# Patient Record
Sex: FEMALE | URBAN - NONMETROPOLITAN AREA
[De-identification: names, ages, dates, MRNs, and addresses within clinical notes are randomized per-mention and may not be internally consistent; named-entity substitution may affect disease eponyms.]

---

## 2022-01-12 ENCOUNTER — APPOINTMENT (RX ONLY)
Dept: URBAN - NONMETROPOLITAN AREA CLINIC 4 | Facility: CLINIC | Age: 77
Setting detail: DERMATOLOGY
End: 2022-01-12

## 2022-01-12 DIAGNOSIS — L82.0 INFLAMED SEBORRHEIC KERATOSIS: ICD-10-CM

## 2022-01-12 PROCEDURE — ? LIQUID NITROGEN

## 2022-01-12 PROCEDURE — ? COUNSELING

## 2022-01-12 PROCEDURE — 17110 DESTRUCTION B9 LES UP TO 14: CPT

## 2022-01-12 PROCEDURE — ? MEDICARE ABN

## 2022-01-12 ASSESSMENT — LOCATION DETAILED DESCRIPTION DERM: LOCATION DETAILED: LEFT LATERAL CANTHUS

## 2022-01-12 ASSESSMENT — LOCATION ZONE DERM: LOCATION ZONE: EYELID

## 2022-01-12 ASSESSMENT — LOCATION SIMPLE DESCRIPTION DERM: LOCATION SIMPLE: LEFT EYELID

## 2022-01-12 NOTE — PROCEDURE: LIQUID NITROGEN
Spray Paint Technique: No
Duration Of Freeze Thaw-Cycle (Seconds): 5-10
Detail Level: Zone
Show Spray Paint Technique Variable?: Yes
Post-Care Instructions: I reviewed with the patient in detail post-care instructions. Patient is to wear sunprotection, and avoid picking at any of the treated lesions. Pt may apply Vaseline to crusted or scabbing areas.
Consent: The patient's consent was obtained including but not limited to risks of crusting, scabbing, blistering, scarring, darker or lighter pigmentary change, recurrence, incomplete removal and infection.
Medical Necessity Information: It is in your best interest to select a reason for this procedure from the list below. All of these items fulfill various CMS LCD requirements except the new and changing color options.
Spray Paint Text: The liquid nitrogen was applied to the skin utilizing a spray paint frosting technique.
Number Of Freeze-Thaw Cycles: 1 freeze-thaw cycle
Medical Necessity Clause: This procedure was medically necessary because the lesions that were treated were:

## 2023-01-09 ENCOUNTER — APPOINTMENT (RX ONLY)
Dept: URBAN - NONMETROPOLITAN AREA CLINIC 4 | Facility: CLINIC | Age: 78
Setting detail: DERMATOLOGY
End: 2023-01-09

## 2023-01-09 DIAGNOSIS — L30.0 NUMMULAR DERMATITIS: ICD-10-CM | Status: INADEQUATELY CONTROLLED

## 2023-01-09 PROCEDURE — ? TREATMENT REGIMEN

## 2023-01-09 PROCEDURE — ? COUNSELING

## 2023-01-09 PROCEDURE — ? PRESCRIPTION

## 2023-01-09 PROCEDURE — 99213 OFFICE O/P EST LOW 20 MIN: CPT

## 2023-01-09 RX ORDER — CLOBETASOL PROPIONATE 0.5 MG/G
0.05% OINTMENT TOPICAL BID
Qty: 60 | Refills: 3 | Status: ERX | COMMUNITY
Start: 2023-01-09

## 2023-01-09 RX ADMIN — CLOBETASOL PROPIONATE 0.05: 0.5 OINTMENT TOPICAL at 00:00

## 2023-01-09 ASSESSMENT — LOCATION ZONE DERM
LOCATION ZONE: ARM
LOCATION ZONE: LEG

## 2023-01-09 ASSESSMENT — LOCATION DETAILED DESCRIPTION DERM
LOCATION DETAILED: LEFT ANTERIOR PROXIMAL THIGH
LOCATION DETAILED: LEFT ANTERIOR DISTAL UPPER ARM
LOCATION DETAILED: LEFT VENTRAL DISTAL FOREARM
LOCATION DETAILED: RIGHT VENTRAL LATERAL PROXIMAL FOREARM
LOCATION DETAILED: RIGHT ANTERIOR PROXIMAL UPPER ARM
LOCATION DETAILED: RIGHT PROXIMAL PRETIBIAL REGION

## 2023-01-09 ASSESSMENT — LOCATION SIMPLE DESCRIPTION DERM
LOCATION SIMPLE: LEFT UPPER ARM
LOCATION SIMPLE: LEFT THIGH
LOCATION SIMPLE: RIGHT UPPER ARM
LOCATION SIMPLE: LEFT FOREARM
LOCATION SIMPLE: RIGHT FOREARM
LOCATION SIMPLE: RIGHT PRETIBIAL REGION

## 2023-01-09 NOTE — HPI: RASH
What Type Of Note Output Would You Prefer (Optional)?: Standard Output
Is The Patient Presenting As Previously Scheduled?: No, they are a work-in
How Severe Is Your Rash?: mild
Is This A New Presentation, Or A Follow-Up?: Rash
Additional History: Patient went to her PCP who put her on Prednisone and Keflex for 10 days and gave her Clotrimazole Betamethasone which helped but did not take it away.

## 2023-01-09 NOTE — PROCEDURE: TREATMENT REGIMEN
Detail Level: Zone
Discontinue Regimen: Clotrimazole Betamethasone; Dial Soap
Initiate Treatment: Clobetasol ointment 0.05% BID x 6 weeks; daily moisturizer Cerave or Cetaphil; Dove Soap for Sensitive Skin

## 2023-02-20 ENCOUNTER — APPOINTMENT (RX ONLY)
Dept: URBAN - NONMETROPOLITAN AREA CLINIC 4 | Facility: CLINIC | Age: 78
Setting detail: DERMATOLOGY
End: 2023-02-20

## 2023-02-20 DIAGNOSIS — L30.0 NUMMULAR DERMATITIS: ICD-10-CM | Status: IMPROVED

## 2023-02-20 PROCEDURE — ? TREATMENT REGIMEN

## 2023-02-20 PROCEDURE — 99213 OFFICE O/P EST LOW 20 MIN: CPT

## 2023-02-20 PROCEDURE — ? COUNSELING

## 2023-02-20 ASSESSMENT — LOCATION SIMPLE DESCRIPTION DERM
LOCATION SIMPLE: LEFT HAND
LOCATION SIMPLE: RIGHT FOREARM
LOCATION SIMPLE: LEFT PRETIBIAL REGION
LOCATION SIMPLE: RIGHT PRETIBIAL REGION
LOCATION SIMPLE: LEFT FOREARM
LOCATION SIMPLE: RIGHT HAND

## 2023-02-20 ASSESSMENT — LOCATION ZONE DERM
LOCATION ZONE: LEG
LOCATION ZONE: HAND
LOCATION ZONE: ARM

## 2023-02-20 ASSESSMENT — LOCATION DETAILED DESCRIPTION DERM
LOCATION DETAILED: RIGHT PROXIMAL PRETIBIAL REGION
LOCATION DETAILED: LEFT PROXIMAL PRETIBIAL REGION
LOCATION DETAILED: RIGHT ULNAR DORSAL HAND
LOCATION DETAILED: LEFT DISTAL DORSAL FOREARM
LOCATION DETAILED: LEFT RADIAL DORSAL HAND
LOCATION DETAILED: RIGHT PROXIMAL ULNAR DORSAL FOREARM

## 2023-02-20 NOTE — PROCEDURE: TREATMENT REGIMEN
Continue Regimen: Clobetasol 0.05% ointment BID; Cerave Cream; Dove soap for Sensitive Skin.
Detail Level: Zone

## 2023-03-27 ENCOUNTER — OFFICE VISIT (OUTPATIENT)
Dept: URGENT CARE | Facility: CLINIC | Age: 78
End: 2023-03-27

## 2023-03-27 VITALS
SYSTOLIC BLOOD PRESSURE: 130 MMHG | HEART RATE: 78 BPM | OXYGEN SATURATION: 98 % | DIASTOLIC BLOOD PRESSURE: 70 MMHG | TEMPERATURE: 97.9 F | RESPIRATION RATE: 18 BRPM

## 2023-03-27 DIAGNOSIS — H61.23 BILATERAL IMPACTED CERUMEN: Primary | ICD-10-CM

## 2023-03-27 RX ORDER — SIMVASTATIN 40 MG
40 TABLET ORAL EVERY EVENING
COMMUNITY
Start: 2023-03-08

## 2023-03-27 RX ORDER — FAMOTIDINE 20 MG/1
TABLET, FILM COATED ORAL
COMMUNITY
Start: 2022-11-21

## 2023-03-27 RX ORDER — LISINOPRIL 20 MG/1
20 TABLET ORAL DAILY
COMMUNITY
Start: 2023-01-04

## 2023-03-27 NOTE — PROGRESS NOTES
[facility]        NAME: Ez Morris is a 76 y o  female  : 1945    MRN: [id number]  DATE: 2023  TIME: 6:38 PM    Assessment and Plan   Bilateral impacted cerumen [H61 23]  1  Bilateral impacted cerumen  Ear cerumen removal        Bilateral cerumen impaction, removed with flushing and instrumentation  Advised to follow-up with PCP if symptoms continue  Patient Instructions     Tylenol for pain or fever  Stay well-hydrated and rest   May use Debrox drops occasionally for maintenance  Using Q-tips or any devices to clean the ears  Follow-up with your PCP  Follow up with PCP in 3-5 days  Proceed to  ER if symptoms worsen  Chief Complaint     Chief Complaint   Patient presents with   • Ear Fullness     Started 2 weeks ago  Bilateral ear fullness, no drainage  Attempted to remove wax unsuccessful         History of Present Illness       68-year-old female presents with feeling like her ears are blocked for the last 2 weeks  Denies any drainage from the ears  PT states she attempted to remove earwax using this instrument she bought online  PT states that she has had to have her ears flushed in the past   Denies any fever, chills, chest pain, trouble breathing, abdominal pain, nausea, vomiting, diarrhea  Review of Systems   Review of Systems   Constitutional: Negative  HENT: Negative for ear discharge and ear pain  Eyes: Negative  Respiratory: Negative  Cardiovascular: Negative  Gastrointestinal: Negative  Musculoskeletal: Negative  Skin: Negative  Neurological: Negative            Current Medications       Current Outpatient Medications:   •  ascorbic acid (VITAMIN C) 1000 MG tablet, Take 1,000 mg by mouth daily, Disp: , Rfl:   •  famotidine (PEPCID) 20 mg tablet, TAKE 1 TABLET BY MOUTH NIGHTLY AS NEEDED FOR HEARTBURN , Disp: , Rfl:   •  lisinopril (ZESTRIL) 20 mg tablet, Take 20 mg by mouth daily, Disp: , Rfl:   •  simvastatin (ZOCOR) 40 mg tablet, Take 40 mg by mouth every evening, Disp: , Rfl:     Current Allergies     Allergies as of 03/27/2023   • (Not on File)            The following portions of the patient's history were reviewed and updated as appropriate: allergies, current medications, past family history, past medical history, past social history, past surgical history and problem list      Past Medical History:   Diagnosis Date   • GERD (gastroesophageal reflux disease)    • Hyperlipidemia    • Hypertension        Past Surgical History:   Procedure Laterality Date   • ADENOIDECTOMY     • HYSTERECTOMY W/ SALPINGO-OOPHERECTOMY     • TONSILLECTOMY         No family history on file  Medications have been verified  Objective   /70   Pulse 78   Temp 97 9 °F (36 6 °C)   Resp 18   SpO2 98%        Physical Exam     Physical Exam  Constitutional:       General: She is not in acute distress  Appearance: Normal appearance  She is not ill-appearing  HENT:      Head: Normocephalic and atraumatic  Right Ear: No drainage, swelling or tenderness  There is impacted cerumen  No mastoid tenderness  Left Ear: No drainage, swelling or tenderness  There is impacted cerumen  No mastoid tenderness  Nose: Nose normal       Mouth/Throat:      Mouth: Mucous membranes are moist       Pharynx: Oropharynx is clear  No posterior oropharyngeal erythema  Eyes:      Extraocular Movements: Extraocular movements intact  Conjunctiva/sclera: Conjunctivae normal       Pupils: Pupils are equal, round, and reactive to light  Cardiovascular:      Rate and Rhythm: Normal rate and regular rhythm  Pulses: Normal pulses  Heart sounds: Normal heart sounds  Pulmonary:      Effort: Pulmonary effort is normal       Breath sounds: Normal breath sounds  Skin:     General: Skin is warm and dry  Capillary Refill: Capillary refill takes less than 2 seconds  Findings: No rash     Neurological:      General: No focal deficit "present  Mental Status: She is alert and oriented to person, place, and time  Psychiatric:         Mood and Affect: Mood normal          Behavior: Behavior normal          Thought Content: Thought content normal                Ear cerumen removal    Date/Time: 3/27/2023 3:28 PM  Performed by: Guadalupe Mittal PA-C  Authorized by: Guadalupe Mittal PA-C   Universal Protocol:  Consent: Verbal consent obtained  Risks and benefits: risks, benefits and alternatives were discussed  Consent given by: patient  Time out: Immediately prior to procedure a \"time out\" was called to verify the correct patient, procedure, equipment, support staff and site/side marked as required  Patient understanding: patient states understanding of the procedure being performed  Patient consent: the patient's understanding of the procedure matches consent given  Required items: required blood products, implants, devices, and special equipment available  Patient identity confirmed: verbally with patient      Procedure details:     Local anesthetic:  None    Location:  L ear and R ear    Procedure type: irrigation with instrumentation      Instrumentation: loop      Approach:  External  Post-procedure details:     Complication:  None    Hearing quality:  Improved    Patient tolerance of procedure:   Tolerated well, no immediate complications        "

## 2023-03-27 NOTE — PATIENT INSTRUCTIONS
Tylenol for pain or fever  Stay well-hydrated and rest   May use Debrox drops occasionally for maintenance  Using Q-tips or any devices to clean the ears  Follow-up with your PCP  Follow up with PCP in 3-5 days  Proceed to  ER if symptoms worsen  Earwax Blockage   AMBULATORY CARE:   Earwax  can build up in your ear canal and cause a blockage  Earwax blockage happens when your ear makes earwax faster than your body can remove it  Common symptoms include the following:   Trouble hearing    Earache    Ear fullness or a feeling that something is plugging up your ear    Itching or ringing in your ear    Dizziness    Seed immediate care if:   You feel dizzy  You have discharge or blood coming out of your ear  Your ear pain does not go away or gets worse  Call your doctor if:   You have a fever  You have trouble hearing or hear ringing noises  You have questions or concerns about your condition or care  Treatment for earwax blockage:   Medicines  placed in the ear canal can soften the earwax so it will come out  Flushing your ear canal  with warm water may flush out the earwax  Small medical tools  may be used to remove the earwax  How to prevent earwax blockage:  Do not stick anything into your ears to clean them  Use cotton swabs on the outside of your ear only  Ask your healthcare provider for more information on ways to prevent blockage  Follow up with your doctor as directed:  Write down your questions so you remember to ask them during your visits  © Copyright [employer] 2022 Information is for End User's use only and may not be sold, redistributed or otherwise used for commercial purposes  The above information is an  only  It is not intended as medical advice for individual conditions or treatments  Talk to your doctor, nurse or pharmacist before following any medical regimen to see if it is safe and effective for you

## 2023-04-03 ENCOUNTER — APPOINTMENT (RX ONLY)
Dept: URBAN - NONMETROPOLITAN AREA CLINIC 4 | Facility: CLINIC | Age: 78
Setting detail: DERMATOLOGY
End: 2023-04-03

## 2023-04-03 DIAGNOSIS — L30.0 NUMMULAR DERMATITIS: ICD-10-CM

## 2023-04-03 PROCEDURE — 99213 OFFICE O/P EST LOW 20 MIN: CPT

## 2023-04-03 PROCEDURE — ? PHOTO-DOCUMENTATION

## 2023-04-03 PROCEDURE — ? COUNSELING

## 2023-04-03 PROCEDURE — ? TREATMENT REGIMEN

## 2023-04-03 ASSESSMENT — LOCATION DETAILED DESCRIPTION DERM: LOCATION DETAILED: LEFT ANTERIOR DISTAL UPPER ARM

## 2023-04-03 ASSESSMENT — LOCATION SIMPLE DESCRIPTION DERM: LOCATION SIMPLE: LEFT UPPER ARM

## 2023-04-03 ASSESSMENT — SEVERITY ASSESSMENT: SEVERITY: MILD

## 2023-04-03 ASSESSMENT — LOCATION ZONE DERM: LOCATION ZONE: ARM

## 2023-08-07 ENCOUNTER — APPOINTMENT (RX ONLY)
Dept: URBAN - NONMETROPOLITAN AREA CLINIC 4 | Facility: CLINIC | Age: 78
Setting detail: DERMATOLOGY
End: 2023-08-07

## 2023-08-07 DIAGNOSIS — L82.0 INFLAMED SEBORRHEIC KERATOSIS: ICD-10-CM

## 2023-08-07 DIAGNOSIS — L30.0 NUMMULAR DERMATITIS: ICD-10-CM

## 2023-08-07 PROCEDURE — ? COUNSELING

## 2023-08-07 PROCEDURE — ? PRESCRIPTION MEDICATION MANAGEMENT

## 2023-08-07 PROCEDURE — 17110 DESTRUCTION B9 LES UP TO 14: CPT

## 2023-08-07 PROCEDURE — 99213 OFFICE O/P EST LOW 20 MIN: CPT | Mod: 25

## 2023-08-07 PROCEDURE — ? LIQUID NITROGEN

## 2023-08-07 ASSESSMENT — LOCATION ZONE DERM
LOCATION ZONE: ARM
LOCATION ZONE: FACE

## 2023-08-07 ASSESSMENT — LOCATION DETAILED DESCRIPTION DERM
LOCATION DETAILED: LEFT SUPERIOR CENTRAL MALAR CHEEK
LOCATION DETAILED: LEFT MEDIAL MALAR CHEEK
LOCATION DETAILED: LEFT DISTAL POSTERIOR UPPER ARM

## 2023-08-07 ASSESSMENT — LOCATION SIMPLE DESCRIPTION DERM
LOCATION SIMPLE: LEFT CHEEK
LOCATION SIMPLE: LEFT POSTERIOR UPPER ARM

## 2023-08-07 NOTE — PROCEDURE: LIQUID NITROGEN
Medical Necessity Information: It is in your best interest to select a reason for this procedure from the list below. All of these items fulfill various CMS LCD requirements except the new and changing color options.
Add 52 Modifier (Optional): no
Spray Paint Text: The liquid nitrogen was applied to the skin utilizing a spray paint frosting technique.
Post-Care Instructions: I reviewed with the patient in detail post-care instructions. Patient is to wear sunprotection, and avoid picking at any of the treated lesions. Pt may apply Vaseline to crusted or scabbing areas.
Render Post-Care Instructions In Note?: yes
Number Of Freeze-Thaw Cycles: 1 freeze-thaw cycle
Duration Of Freeze Thaw-Cycle (Seconds): 5-10
Detail Level: Zone
Medical Necessity Clause: This procedure was medically necessary because the lesions that were treated were:
Consent: The patient's consent was obtained including but not limited to risks of crusting, scabbing, blistering, scarring, darker or lighter pigmentary change, recurrence, incomplete removal and infection.

## 2023-10-26 ENCOUNTER — EVALUATION (OUTPATIENT)
Dept: PHYSICAL THERAPY | Facility: CLINIC | Age: 78
End: 2023-10-26
Payer: MEDICARE

## 2023-10-26 DIAGNOSIS — M79.672 LEFT FOOT PAIN: Primary | ICD-10-CM

## 2023-10-26 PROCEDURE — 97110 THERAPEUTIC EXERCISES: CPT | Performed by: PHYSICAL THERAPIST

## 2023-10-26 PROCEDURE — 97161 PT EVAL LOW COMPLEX 20 MIN: CPT | Performed by: PHYSICAL THERAPIST

## 2023-10-26 PROCEDURE — 97140 MANUAL THERAPY 1/> REGIONS: CPT | Performed by: PHYSICAL THERAPIST

## 2023-10-26 NOTE — PROGRESS NOTES
PT Evaluation     Today's date: 10/26/2023  Patient name: Abundio Medina  : 1945  MRN: [id number]  Referring provider: Arnoldo Tillman MD  Dx:   Encounter Diagnosis     ICD-10-CM    1. Left foot pain  M79.672           Start Time: 1100  Stop Time: 1200  Total time in clinic (min): 60 minutes    Assessment  Assessment details: Patient is a 75 y/o female with chief c/o L foot pain. Patient has tenderness with palpation to the posterior aspect of the L heel around the achilles insertion and just distal around the bony protuberance noted to this area. She demonstrates R hip weakness and hypomobility of the L talocrural joint most noted into dorsiflexion when compared to the contralateral side. She responded well to therapy interventions which focused on improving passive mobility of the gastroc mm. She was instructed on performance of self stretching in a standing position for her home program along with icing in a seated position for HEP. Impairments: abnormal gait, abnormal or restricted ROM, activity intolerance, impaired physical strength, lacks appropriate home exercise program and pain with function    Symptom irritability: moderateUnderstanding of Dx/Px/POC: good   Prognosis: good    Goals  STGs:  "Patient will be independent with hep by 2-3 visits. Improve ankle rom to wnl for improved tolerance with ambulation and adls by 2-4 weeks. Decrease ankle pain by 50% for improved tolerance with ambulation and adls by 2-4 weeks. "    LTGs:  "Improve FOTO score from 38 to 61 indicating improved tolerance with activities involving the lower extremities by discharge. Improve ankle rom and strength to wnl for improved tolerance with adls, ambulation, and home duties by discharge. Patient will be able to ambulate over community distances without pain or deviation by discharge.    Patient will be able to return to prior level of housework by discharge. "      Plan  Plan details: Patient informed that from this point forward, to ensure adherence to the aforementioned plan of care, all or some of the treatment may be performed and carried out by a Physical Therapy Assistant (PTA) with supervision from a licensed Physical Therapist (PT) in accordance with [address] [place] [address]. Patient will continue to benefit from skilled physical therapy to address the functional deficits that were identified during the evaluation today. We will continue to progress the therapy program to address these functional deficits and achieve the established goals. Patient would benefit from: skilled physical therapy  Planned modality interventions: cryotherapy  Planned therapy interventions: balance, functional ROM exercises, gait training, home exercise program, therapeutic exercise, therapeutic activities, stretching, strengthening, manual therapy and neuromuscular re-education  Frequency: 1-2x week. Duration in weeks: 4  Plan of Care beginning date: 10/26/2023  Plan of Care expiration date: 2023  Treatment plan discussed with: patient      Subjective Evaluation    History of Present Illness  Mechanism of injury: Patient presents to out patient physical therapy with chief c/o L foot pain. Patient reports onset of posterior heel pain over the past 4-6 weeks. She notes that there is increased pain with standing or walking for longer periods of time most noted when she is wearing shoes. She has recently acquired orthotics for both feet which she feels has helped with her symptoms. Not a recurrent problem   Quality of life: good    Patient Goals  Patient goals for therapy: decreased pain, return to sport/leisure activities, increased strength and increased motion  Patient goal: Patient wishes to be able to return to her normal housework and exercising without pain in her foot.   Pain  Current pain ratin  At best pain ratin  At worst pain ratin  Location: L foot  Quality: discomfort, throbbing and sharp  Relieving factors: rest and change in position  Aggravating factors: stair climbing, walking and standing    Social Support    Employment status: not working      Objective     Active Range of Motion   Left Ankle/Foot   Dorsiflexion (kf): -4 degrees   Plantar flexion: 49 degrees   Inversion: 20 degrees   Eversion: 16 degrees   Great toe flexion: 4 degrees   Great toe extension: 56 degrees     Right Ankle/Foot   Dorsiflexion (kf): 6 degrees   Plantar flexion: 52 degrees   Inversion: 28 degrees   Eversion: 12 degrees   Great toe flexion: 17 degrees   Great toe extension: 65 degrees     Strength/Myotome Testing     Left Hip   Planes of Motion   Flexion: 5  Extension: 4+  Abduction: 4+  Adduction: 5  External rotation: 4+  Internal rotation: 4+    Right Hip   Planes of Motion   Flexion: 5  Extension: 4+  Abduction: 4  Adduction: 5  External rotation: 4+  Internal rotation: 4+    Left Knee   Flexion: 4+  Extension: 4+    Right Knee   Flexion: 4+  Extension: 4+    Left Ankle/Foot   Dorsiflexion: 4+  Plantar flexion: 4  Inversion: 4+  Eversion: 4+    Right Ankle/Foot   Dorsiflexion: 4+  Plantar flexion: 4  Inversion: 4+  Eversion: 4+    Ambulation     Observational Gait     Additional Observational Gait Details  Patient is ambulating with noted increase to B/L LE ER with slight medial heel whip noted B/L. There is mild trendelenburg lurch noted during R stance phase of gait.               Precautions: Hx of R MANDY June 2023      Date 10/26 IE       FOTO 38 SC       Manuals        STM to gastroc 8 min       Passive gastroc stretch 30" 5x                       Neuro Re-Ed        Clamshells        Toe yoga        Foot doming        SLS                                Ther Ex        Ankle arom 10x ea       Standing gastroc stretch 10" 10x       Standing/sidelying hip abduction        NuStep        Hip hiking                                Ther Activity                        Gait Training Modalities

## 2023-10-26 NOTE — LETTER
2023    Zach Reyes MD  [facility]    Patient: Maldonado Al   YOB: 1945   Date of Visit: 10/26/2023     Encounter Diagnosis     ICD-10-CM    1. Left foot pain  M79.672           Dear Dr. Renetta Bob:    Thank you for your recent referral of Maldonado Al. Please review the attached evaluation summary from Renee's recent visit. Please verify that you agree with the plan of care by signing the attached order. If you have any questions or concerns, please do not hesitate to call. I sincerely appreciate the opportunity to share in the care of one of your patients and hope to have another opportunity to work with you in the near future. Sincerely,    Gussie Sandhoff, PT      Referring Provider:      I certify that I have read the below Plan of Care and certify the need for these services furnished under this plan of treatment while under my care. Zach Reyes MD  [address]  [address]  Via Fax: [phone]          PT Evaluation     Today's date: 10/26/2023  Patient name: Maldonado Al  : 1945  MRN: [id number]  Referring provider: Zach Reyes MD  Dx:   Encounter Diagnosis     ICD-10-CM    1. Left foot pain  M79.672           Start Time: 1100  Stop Time: 1200  Total time in clinic (min): 60 minutes    Assessment  Assessment details: Patient is a 75 y/o female with chief c/o L foot pain. Patient has tenderness with palpation to the posterior aspect of the L heel around the achilles insertion and just distal around the bony protuberance noted to this area. She demonstrates R hip weakness and hypomobility of the L talocrural joint most noted into dorsiflexion when compared to the contralateral side. She responded well to therapy interventions which focused on improving passive mobility of the gastroc mm.  She was instructed on performance of self stretching in a standing position for her home program along with icing in a seated position for HEP. Impairments: abnormal gait, abnormal or restricted ROM, activity intolerance, impaired physical strength, lacks appropriate home exercise program and pain with function    Symptom irritability: moderateUnderstanding of Dx/Px/POC: good   Prognosis: good    Goals  STGs:  "Patient will be independent with hep by 2-3 visits. Improve ankle rom to wnl for improved tolerance with ambulation and adls by 2-4 weeks. Decrease ankle pain by 50% for improved tolerance with ambulation and adls by 2-4 weeks. "    LTGs:  "Improve FOTO score from 38 to 61 indicating improved tolerance with activities involving the lower extremities by discharge. Improve ankle rom and strength to wnl for improved tolerance with adls, ambulation, and home duties by discharge. Patient will be able to ambulate over community distances without pain or deviation by discharge. Patient will be able to return to prior level of housework by discharge. "      Plan  Plan details: Patient informed that from this point forward, to ensure adherence to the aforementioned plan of care, all or some of the treatment may be performed and carried out by a Physical Therapy Assistant (PTA) with supervision from a licensed Physical Therapist (PT) in accordance with [address] [place] [address]. Patient will continue to benefit from skilled physical therapy to address the functional deficits that were identified during the evaluation today. We will continue to progress the therapy program to address these functional deficits and achieve the established goals.           Patient would benefit from: skilled physical therapy  Planned modality interventions: cryotherapy  Planned therapy interventions: balance, functional ROM exercises, gait training, home exercise program, therapeutic exercise, therapeutic activities, stretching, strengthening, manual therapy and neuromuscular re-education  Frequency: 1-2x week.  Duration in weeks: 4  Plan of Care beginning date: 10/26/2023  Plan of Care expiration date: 2023  Treatment plan discussed with: patient      Subjective Evaluation    History of Present Illness  Mechanism of injury: Patient presents to out patient physical therapy with chief c/o L foot pain. Patient reports onset of posterior heel pain over the past 4-6 weeks. She notes that there is increased pain with standing or walking for longer periods of time most noted when she is wearing shoes. She has recently acquired orthotics for both feet which she feels has helped with her symptoms. Not a recurrent problem   Quality of life: good    Patient Goals  Patient goals for therapy: decreased pain, return to sport/leisure activities, increased strength and increased motion  Patient goal: Patient wishes to be able to return to her normal housework and exercising without pain in her foot.   Pain  Current pain ratin  At best pain ratin  At worst pain ratin  Location: L foot  Quality: discomfort, throbbing and sharp  Relieving factors: rest and change in position  Aggravating factors: stair climbing, walking and standing    Social Support    Employment status: not working      Objective     Active Range of Motion   Left Ankle/Foot   Dorsiflexion (kf): -4 degrees   Plantar flexion: 49 degrees   Inversion: 20 degrees   Eversion: 16 degrees   Great toe flexion: 4 degrees   Great toe extension: 56 degrees     Right Ankle/Foot   Dorsiflexion (kf): 6 degrees   Plantar flexion: 52 degrees   Inversion: 28 degrees   Eversion: 12 degrees   Great toe flexion: 17 degrees   Great toe extension: 65 degrees     Strength/Myotome Testing     Left Hip   Planes of Motion   Flexion: 5  Extension: 4+  Abduction: 4+  Adduction: 5  External rotation: 4+  Internal rotation: 4+    Right Hip   Planes of Motion   Flexion: 5  Extension: 4+  Abduction: 4  Adduction: 5  External rotation: 4+  Internal rotation: 4+    Left Knee   Flexion: 4+  Extension: 4+    Right Knee   Flexion: 4+  Extension: 4+    Left Ankle/Foot   Dorsiflexion: 4+  Plantar flexion: 4  Inversion: 4+  Eversion: 4+    Right Ankle/Foot   Dorsiflexion: 4+  Plantar flexion: 4  Inversion: 4+  Eversion: 4+    Ambulation     Observational Gait     Additional Observational Gait Details  Patient is ambulating with noted increase to B/L LE ER with slight medial heel whip noted B/L. There is mild trendelenburg lurch noted during R stance phase of gait.               Precautions: Hx of R MANDY June 2023      Date 10/26 IE       FOTO 38 SC       Manuals        STM to gastroc 8 min       Passive gastroc stretch 30" 5x                       Neuro Re-Ed        Clamshells        Toe yoga        Foot doming        SLS                                Ther Ex        Ankle arom 10x ea       Standing gastroc stretch 10" 10x       Standing/sidelying hip abduction        NuStep        Hip hiking                                Ther Activity                        Gait Training                        Modalities

## 2023-11-03 ENCOUNTER — OFFICE VISIT (OUTPATIENT)
Dept: PHYSICAL THERAPY | Facility: CLINIC | Age: 78
End: 2023-11-03
Payer: MEDICARE

## 2023-11-03 DIAGNOSIS — M79.672 LEFT FOOT PAIN: Primary | ICD-10-CM

## 2023-11-03 PROCEDURE — 97112 NEUROMUSCULAR REEDUCATION: CPT | Performed by: PHYSICAL THERAPIST

## 2023-11-03 PROCEDURE — 97110 THERAPEUTIC EXERCISES: CPT | Performed by: PHYSICAL THERAPIST

## 2023-11-03 PROCEDURE — 97140 MANUAL THERAPY 1/> REGIONS: CPT | Performed by: PHYSICAL THERAPIST

## 2023-11-03 NOTE — PROGRESS NOTES
Daily Note     Today's date: 11/3/2023  Patient name: Richy Ruffin  : 1945  MRN: [id number]  Referring provider: Mak Purcell MD  Dx:   Encounter Diagnosis     ICD-10-CM    1. Left foot pain  M79.672           Start Time: 1100  Stop Time: 1200  Total time in clinic (min): 60 minutes    Subjective: Patient reports that she was very active this past week and has noticed an increase in heel pain. She notes that she did see a podiatrist yesterday who is recommending continuation of physical therapy. Objective: See treatment diary below      Assessment: Tolerated treatment fair. Patient demonstrated fatigue post treatment, exhibited good technique with therapeutic exercises, and would benefit from continued PT Patient responded well to therapy interventions today. She noted no increase to baseline pain levels but offers no relief of symptoms post interventions. She was instructed on intrinsic strengthening for home program today. Plan: Continue per plan of care. Progress treatment as tolerated.        Precautions: Hx of R MANDY 2023      Date 10/26 IE 11/3      FOTO 38 SC       Manuals        STM to gastroc 8 min 8 min      Passive gastroc stretch 30" 5x 30" 5x                      Neuro Re-Ed        Clamshells  5" 15x      Toe yoga  15x      Foot doming  5" 15x      SLS  15" 4x      Toe curls  5"15x                      Ther Ex        Ankle arom 10x ea 20x ea      Standing gastroc stretch 10" 10x       Standing/sidelying hip abduction  Side lying 20x      NuStep  L6 6 min      Hip hiking  3" 15x                              Ther Activity                        Gait Training                        Modalities

## 2023-11-10 ENCOUNTER — OFFICE VISIT (OUTPATIENT)
Dept: PHYSICAL THERAPY | Facility: CLINIC | Age: 78
End: 2023-11-10
Payer: MEDICARE

## 2023-11-10 DIAGNOSIS — M79.672 LEFT FOOT PAIN: Primary | ICD-10-CM

## 2023-11-10 PROCEDURE — 97110 THERAPEUTIC EXERCISES: CPT | Performed by: PHYSICAL THERAPIST

## 2023-11-10 PROCEDURE — 97112 NEUROMUSCULAR REEDUCATION: CPT | Performed by: PHYSICAL THERAPIST

## 2023-11-10 PROCEDURE — 97140 MANUAL THERAPY 1/> REGIONS: CPT | Performed by: PHYSICAL THERAPIST

## 2023-11-10 NOTE — PROGRESS NOTES
Daily Note     Today's date: 11/10/2023  Patient name: Luke Mitchell  : 1945  MRN: [id number]  Referring provider: Keshav Barfield MD  Dx:   Encounter Diagnosis     ICD-10-CM    1. Left foot pain  M79.672           Start Time: 1000  Stop Time: 1100  Total time in clinic (min): 60 minutes    Subjective: Patient reports improvements with pain and swelling to the L ankle and heel. She feels that the exercises that she has been doing are helping to control her symptoms and that they are becoming easier to perform. Objective: See treatment diary below      Assessment: Tolerated treatment well. Patient demonstrated fatigue post treatment, exhibited good technique with therapeutic exercises, and would benefit from continued PT Patient noted mild discomfort to the plantar aspect of the L heel during standing gastroc stretching. He noted more fatigue to the R hip during standing exercises than anywhere else. She responded well to progression of strengthening interventions. She is reporting decreased tenderness to the gastroc during soft tissue work. Plan: Continue per plan of care. Progress treatment as tolerated. Precautions: Hx of R MANDY 2023      Date 10/26 IE 11/3 11/10     FOTO 38 SC       Manuals        STM to gastroc 8 min 8 min 8 min     Passive gastroc stretch 30" 5x 30" 5x                      Neuro Re-Ed        Clamshells  5" 15x 5" 15x     Toe yoga  15x 15x     Foot doming  5" 15x 5" 15x     SLS  15" 4x Airex 20" 4x ea.       Toe curls  5"15x 5" 15x     Tandem walking on airex   5 laps             Ther Ex        Ankle arom 10x ea 20x ea 20x     Standing gastroc stretch 10" 10x  10" 10x     Standing/side lying hip abduction  Side lying 20x      NuStep  L6 6 min L6 8 min     Hip hiking  3" 15x 3" 15x     Standing HR w/ weighted ball   3# 15x             Ther Activity                        Gait Training                        Modalities

## 2023-11-15 ENCOUNTER — OFFICE VISIT (OUTPATIENT)
Dept: PHYSICAL THERAPY | Facility: CLINIC | Age: 78
End: 2023-11-15
Payer: MEDICARE

## 2023-11-15 DIAGNOSIS — M79.672 LEFT FOOT PAIN: Primary | ICD-10-CM

## 2023-11-15 PROCEDURE — 97110 THERAPEUTIC EXERCISES: CPT

## 2023-11-15 PROCEDURE — 97140 MANUAL THERAPY 1/> REGIONS: CPT

## 2023-11-15 PROCEDURE — 97112 NEUROMUSCULAR REEDUCATION: CPT

## 2023-11-15 NOTE — PROGRESS NOTES
Daily Note     Today's date: 11/15/2023  Patient name: Carol Jimenez  : 1945  MRN: [id number]  Referring provider: Harpreet Daniel MD  Dx:   Encounter Diagnosis     ICD-10-CM    1. Left foot pain  M79.672           Start Time: 0800  Stop Time: 0900  Total time in clinic (min): 60 minutes    Subjective: Pt notes persisting L achilles/heel discomfort with increased activities: mainly at the end of the day. Objective: See treatment diary below      Assessment: Tolerated treatment well. Patient exhibited good technique with therapeutic exercises. PTA provided manual stretch to the calf  in prone f/b stm to the same including achilles. Less discomfort noted after tx      Plan: Continue per plan of care. Precautions: Hx of R MANDY 2023      Date 10/26 IE 11/3 11/10 11/15    FOTO 38 SC       Manuals        STM to gastroc 8 min 8 min 8 min Ds_ stm gastroc    Passive gastroc stretch 30" 5x 30" 5x  ds            Neuro Re-Ed        Clamshells  5" 15x 5" 15x 10x 10"    Toe yoga  15x 15x 15x    Foot doming  5" 15x 5" 15x 15x 5"    SLS=airex  15" 4x Airex 20" 4x ea.   4x 20"    Toe curls  5"15x 5" 15x 20x 5"    Tandem walking on airex   5 laps 5 laps            Ther Ex        Ankle arom 10x ea 20x ea 20x 20x     Standing gastroc stretch 10" 10x  10" 10x home    side lying hip abduction  Side lying 20x  20x     NuStep  L6 6 min L6 8 min 8m L6    Hip hiking  3" 15x 3" 15x 15x 3"    Standing HR w/ weighted ball   3# 15x 3# 20x             Ther Activity                Gait Training                Modalities

## 2023-11-21 ENCOUNTER — OFFICE VISIT (OUTPATIENT)
Dept: PHYSICAL THERAPY | Facility: CLINIC | Age: 78
End: 2023-11-21
Payer: MEDICARE

## 2023-11-21 DIAGNOSIS — M79.672 LEFT FOOT PAIN: Primary | ICD-10-CM

## 2023-11-21 PROCEDURE — 97110 THERAPEUTIC EXERCISES: CPT | Performed by: PHYSICAL THERAPIST

## 2023-11-21 PROCEDURE — 97112 NEUROMUSCULAR REEDUCATION: CPT | Performed by: PHYSICAL THERAPIST

## 2023-11-21 PROCEDURE — 97140 MANUAL THERAPY 1/> REGIONS: CPT | Performed by: PHYSICAL THERAPIST

## 2023-11-21 NOTE — PROGRESS NOTES
Daily Note     Today's date: 2023  Patient name: Charity Birch  : 1945  MRN: [id number]  Referring provider: Syed Bernal MD  Dx:   Encounter Diagnosis     ICD-10-CM    1. Left foot pain  M79.672           Start Time: 0800  Stop Time: 0900  Total time in clinic (min): 60 minutes    Subjective: Patient reports that she was at the mall a lot yesterday and was not wearing her sneakers so her foot was bothering her a lot. She had to take some medication to help control her pain yesterday. Objective: See treatment diary below      Assessment: Tolerated treatment well. Patient demonstrated fatigue post treatment, exhibited good technique with therapeutic exercises, and would benefit from continued PT Patient continues with tenderness to the posterior calcaneus at the achilles insertion but this is less intense than when she started therapy. She was challenged with progression of strengthening interventions today in a standing position. Plan: Continue per plan of care. Progress treatment as tolerated. Precautions: Hx of R MANDY 2023      Date 10/26 IE 11/3 11/10 11/15 11/21   FOTO 38 SC    54 SC   Manuals        STM to gastroc 8 min 8 min 8 min Ds_ stm gastroc 5 min SC   Passive gastroc stretch 30" 5x 30" 5x  ds 30" 5x           Neuro Re-Ed        Clamshells  5" 15x 5" 15x 10x 10" 10" 10x   Toe yoga  15x 15x 15x Standing 15   Foot doming  5" 15x 5" 15x 15x 5" Standing 3" 15x   SLS=airex  15" 4x Airex 20" 4x ea. 4x 20" Airex 20" 6x   Toe curls  5"15x 5" 15x 20x 5" Standing 3" 15x   Tandem walking on airex   5 laps 5 laps            Ther Ex        Ankle arom 10x ea 20x ea 20x 20x  Standing wobble board 20x ea.     Standing gastroc stretch 10" 10x  10" 10x home    side lying hip abduction  Side lying 20x  20x  20x   NuStep  L6 6 min L6 8 min 8m L6 L6 8 min   Hip hiking  3" 15x 3" 15x 15x 3" 3" 15x   Standing HR w/ weighted ball   3# 15x 3# 20x  3# 20x   Eccentric HR     2" step 2x15 Ther Activity                Gait Training                Modalities

## 2023-11-29 ENCOUNTER — EVALUATION (OUTPATIENT)
Dept: PHYSICAL THERAPY | Facility: CLINIC | Age: 78
End: 2023-11-29
Payer: MEDICARE

## 2023-11-29 DIAGNOSIS — M79.672 LEFT FOOT PAIN: Primary | ICD-10-CM

## 2023-11-29 PROCEDURE — 97110 THERAPEUTIC EXERCISES: CPT | Performed by: PHYSICAL THERAPIST

## 2023-11-29 PROCEDURE — 97140 MANUAL THERAPY 1/> REGIONS: CPT | Performed by: PHYSICAL THERAPIST

## 2023-11-29 PROCEDURE — 97112 NEUROMUSCULAR REEDUCATION: CPT | Performed by: PHYSICAL THERAPIST

## 2023-11-29 NOTE — PROGRESS NOTES
PT Re-Evaluation     Today's date: 2023  Patient name: Jim Urias  : 1945  MRN: [id number]  Referring provider: Sharri Pineda MD  Dx:   Encounter Diagnosis     ICD-10-CM    1. Left foot pain  M79.672           Start Time: 0800  Stop Time: 0900  Total time in clinic (min): 60 minutes    Assessment  Assessment details: Patient has attended 6 physical therapy visits to date. She is demonstrating improving strength and mobility of the L ankle and foot per the evaluation today when compared to the initial evaluation. There continues to mild restrictions during ankle DF both during arom assessment and during passive stretching. There is also mild weakness noted to L ankle PF and DF. She continues to have mild tenderness with palpation to the posterior aspect of the L heel but there is less edema noted to the medial heel around the tarsal tunnel. Impairments: abnormal gait, abnormal or restricted ROM, activity intolerance, impaired physical strength, lacks appropriate home exercise program and pain with function    Symptom irritability: moderateUnderstanding of Dx/Px/POC: good   Prognosis: good    Goals  STGs:  "Patient will be independent with hep by 2-3 visits. Improve ankle rom to wnl for improved tolerance with ambulation and adls by 2-4 weeks. Decrease ankle pain by 50% for improved tolerance with ambulation and adls by 2-4 weeks. "    LTGs:  "Improve FOTO score from 38 to 61 indicating improved tolerance with activities involving the lower extremities by discharge. Improve ankle rom and strength to wnl for improved tolerance with adls, ambulation, and home duties by discharge. Patient will be able to ambulate over community distances without pain or deviation by discharge.    Patient will be able to return to prior level of housework by discharge. "      Plan  Plan details: Patient informed that from this point forward, to ensure adherence to the aforementioned plan of care, all or some of the treatment may be performed and carried out by a Physical Therapy Assistant (PTA) with supervision from a licensed Physical Therapist (PT) in accordance with [address] [place] [address]. Patient will continue to benefit from skilled physical therapy to address the functional deficits that were identified during the evaluation today. We will continue to progress the therapy program to address these functional deficits and achieve the established goals. Patient would benefit from: skilled physical therapy  Planned modality interventions: cryotherapy  Planned therapy interventions: balance, functional ROM exercises, gait training, home exercise program, therapeutic exercise, therapeutic activities, stretching, strengthening, manual therapy and neuromuscular re-education  Frequency: 1-2x week. Duration in weeks: 4  Plan of Care beginning date: 11/29/2023  Plan of Care expiration date: 12/27/2023  Treatment plan discussed with: patient      Subjective Evaluation    History of Present Illness  Mechanism of injury: Patient presents to out patient physical therapy with chief c/o L foot pain. Patient reports onset of posterior heel pain over the past 4-6 weeks. She notes that there is increased pain with standing or walking for longer periods of time most noted when she is wearing shoes. She has recently acquired orthotics for both feet which she feels has helped with her symptoms. Update 11/29/2023:  Patient reports that her foot has been improving but she feels that she is not back to her prior level of activity yet. She notes that the pain is less when she is walking around and on her feet. She notes that when she is walking in the mall for longer than 45 minutes she will notice an increase in pain.            Not a recurrent problem   Quality of life: good    Patient Goals  Patient goals for therapy: decreased pain, return to sport/leisure activities, increased strength and increased motion  Patient goal: Patient wishes to be able to return to her normal housework and exercising without pain in her foot. Pain  Current pain ratin  At best pain ratin  At worst pain ratin  Location: L foot  Quality: discomfort, throbbing and sharp  Relieving factors: rest and change in position  Aggravating factors: stair climbing, walking and standing    Social Support    Employment status: not working      Objective     Active Range of Motion   Left Ankle/Foot   Dorsiflexion (kf): 4 degrees   Plantar flexion: 54 degrees   Inversion: 26 degrees   Eversion: 11 degrees   Great toe flexion: 19 degrees   Great toe extension: 51 degrees     Right Ankle/Foot   Dorsiflexion (kf): 6 degrees   Plantar flexion: 52 degrees   Inversion: 28 degrees   Eversion: 12 degrees   Great toe flexion: 17 degrees   Great toe extension: 65 degrees     Strength/Myotome Testing     Left Hip   Planes of Motion   Flexion: 5  Extension: 4+  Abduction: 4+  Adduction: 5  External rotation: 4+  Internal rotation: 4+    Right Hip   Planes of Motion   Flexion: 5  Extension: 4+  Abduction: 4  Adduction: 5  External rotation: 4+  Internal rotation: 4+    Left Knee   Flexion: 4+  Extension: 4+    Right Knee   Flexion: 4+  Extension: 4+    Left Ankle/Foot   Dorsiflexion: 4+  Plantar flexion: 4+  Inversion: 5  Eversion: 5    Right Ankle/Foot   Dorsiflexion: 4+  Plantar flexion: 4  Inversion: 4+  Eversion: 4+    Ambulation     Observational Gait     Additional Observational Gait Details  Patient is ambulating with noted increase to B/L LE ER with slight medial heel whip noted B/L. There is mild trendelenburg lurch noted during R stance phase of gait.               Precautions: Hx of R MANDY 2023      Date  Reassess 11/3 11/10 11/15 11/21   FOTO     47 SC   Manuals        STM to gastroc 5 min 8 min 8 min Ds_ stm gastroc 5 min SC   Passive gastroc stretch 30" 5x 30" 5x  ds 30" 5x           Neuro Re-Ed        Clamshells  5" 15x 5" 15x 10x 10" 10" 10x   Toe yoga Stand 5" 15x 15x 15x 15x Standing 15   Foot doming Stand 5" 15x 5" 15x 5" 15x 15x 5" Standing 3" 15x   SLS=airex Airex 20" 5x 15" 4x Airex 20" 4x ea. 4x 20" Airex 20" 6x   Toe curls Stand 5" 15x 5"15x 5" 15x 20x 5" Standing 3" 15x   Tandem walking on airex 5 laps  5 laps 5 laps            Ther Ex        Ankle arom Standing wobble board 20x ea. 20x ea 20x 20x  Standing wobble board 20x ea.     Standing gastroc stretch   10" 10x home    side lying hip abduction  Side lying 20x  20x  20x   NuStep L6 9 min L6 6 min L6 8 min 8m L6 L6 8 min   Hip hiking 3" 15x 3" 15x 3" 15x 15x 3" 3" 15x   Standing HR w/ weighted ball 3# 20x  3# 15x 3# 20x  3# 20x   Eccentric HR 2" step 20x    2" step 2x15   Ther Activity                Gait Training                Modalities

## 2023-11-29 NOTE — LETTER
2023    Vj Rg MD  [facility]    Patient: Loreta Santana   YOB: 1945   Date of Visit: 2023     Encounter Diagnosis     ICD-10-CM    1. Left foot pain  M79.672           Dear Dr. Lindsay Kathleen:    Thank you for your recent referral of Loreta Santana. Please review the attached evaluation summary from Renee's recent visit. Please verify that you agree with the plan of care by signing the attached order. If you have any questions or concerns, please do not hesitate to call. I sincerely appreciate the opportunity to share in the care of one of your patients and hope to have another opportunity to work with you in the near future. Sincerely,    Dolores Mary, PT      Referring Provider:      I certify that I have read the below Plan of Care and certify the need for these services furnished under this plan of treatment while under my care. Vj Rg MD  [address] [id number]  Via Fax: [phone]          PT Re-Evaluation     Today's date: 2023  Patient name: Loreta Santana  : 1945  MRN: [id number]  Referring provider: Vj Rg MD  Dx:   Encounter Diagnosis     ICD-10-CM    1. Left foot pain  M79.672           Start Time: 0800  Stop Time: 0900  Total time in clinic (min): 60 minutes    Assessment  Assessment details: Patient has attended 6 physical therapy visits to date. She is demonstrating improving strength and mobility of the L ankle and foot per the evaluation today when compared to the initial evaluation. There continues to mild restrictions during ankle DF both during arom assessment and during passive stretching. There is also mild weakness noted to L ankle PF and DF. She continues to have mild tenderness with palpation to the posterior aspect of the L heel but there is less edema noted to the medial heel around the tarsal tunnel.    Impairments: abnormal gait, abnormal or restricted ROM, activity intolerance, impaired physical strength, lacks appropriate home exercise program and pain with function    Symptom irritability: moderateUnderstanding of Dx/Px/POC: good   Prognosis: good    Goals  STGs:  "Patient will be independent with hep by 2-3 visits. Improve ankle rom to wnl for improved tolerance with ambulation and adls by 2-4 weeks. Decrease ankle pain by 50% for improved tolerance with ambulation and adls by 2-4 weeks. "    LTGs:  "Improve FOTO score from 38 to 61 indicating improved tolerance with activities involving the lower extremities by discharge. Improve ankle rom and strength to wnl for improved tolerance with adls, ambulation, and home duties by discharge. Patient will be able to ambulate over community distances without pain or deviation by discharge. Patient will be able to return to prior level of housework by discharge. "      Plan  Plan details: Patient informed that from this point forward, to ensure adherence to the aforementioned plan of care, all or some of the treatment may be performed and carried out by a Physical Therapy Assistant (PTA) with supervision from a licensed Physical Therapist (PT) in accordance with [address] [place] [address]. Patient will continue to benefit from skilled physical therapy to address the functional deficits that were identified during the evaluation today. We will continue to progress the therapy program to address these functional deficits and achieve the established goals. Patient would benefit from: skilled physical therapy  Planned modality interventions: cryotherapy  Planned therapy interventions: balance, functional ROM exercises, gait training, home exercise program, therapeutic exercise, therapeutic activities, stretching, strengthening, manual therapy and neuromuscular re-education  Frequency: 1-2x week.   Duration in weeks: 4  Plan of Care beginning date: 11/29/2023  Plan of Care expiration date: 2023  Treatment plan discussed with: patient      Subjective Evaluation    History of Present Illness  Mechanism of injury: Patient presents to out patient physical therapy with chief c/o L foot pain. Patient reports onset of posterior heel pain over the past 4-6 weeks. She notes that there is increased pain with standing or walking for longer periods of time most noted when she is wearing shoes. She has recently acquired orthotics for both feet which she feels has helped with her symptoms. Update 2023:  Patient reports that her foot has been improving but she feels that she is not back to her prior level of activity yet. She notes that the pain is less when she is walking around and on her feet. She notes that when she is walking in the mall for longer than 45 minutes she will notice an increase in pain. Not a recurrent problem   Quality of life: good    Patient Goals  Patient goals for therapy: decreased pain, return to sport/leisure activities, increased strength and increased motion  Patient goal: Patient wishes to be able to return to her normal housework and exercising without pain in her foot.   Pain  Current pain ratin  At best pain ratin  At worst pain ratin  Location: L foot  Quality: discomfort, throbbing and sharp  Relieving factors: rest and change in position  Aggravating factors: stair climbing, walking and standing    Social Support    Employment status: not working      Objective     Active Range of Motion   Left Ankle/Foot   Dorsiflexion (kf): 4 degrees   Plantar flexion: 54 degrees   Inversion: 26 degrees   Eversion: 11 degrees   Great toe flexion: 19 degrees   Great toe extension: 51 degrees     Right Ankle/Foot   Dorsiflexion (kf): 6 degrees   Plantar flexion: 52 degrees   Inversion: 28 degrees   Eversion: 12 degrees   Great toe flexion: 17 degrees   Great toe extension: 65 degrees     Strength/Myotome Testing     Left Hip   Planes of Motion   Flexion: 5  Extension: 4+  Abduction: 4+  Adduction: 5  External rotation: 4+  Internal rotation: 4+    Right Hip   Planes of Motion   Flexion: 5  Extension: 4+  Abduction: 4  Adduction: 5  External rotation: 4+  Internal rotation: 4+    Left Knee   Flexion: 4+  Extension: 4+    Right Knee   Flexion: 4+  Extension: 4+    Left Ankle/Foot   Dorsiflexion: 4+  Plantar flexion: 4+  Inversion: 5  Eversion: 5    Right Ankle/Foot   Dorsiflexion: 4+  Plantar flexion: 4  Inversion: 4+  Eversion: 4+    Ambulation     Observational Gait     Additional Observational Gait Details  Patient is ambulating with noted increase to B/L LE ER with slight medial heel whip noted B/L. There is mild trendelenburg lurch noted during R stance phase of gait. Precautions: Hx of R MANDY June 2023      Date 11/29 Reassess 11/3 11/10 11/15 11/21   FOTO     47 SC   Manuals        STM to gastroc 5 min 8 min 8 min Ds_ stm gastroc 5 min SC   Passive gastroc stretch 30" 5x 30" 5x  ds 30" 5x           Neuro Re-Ed        Clamshells  5" 15x 5" 15x 10x 10" 10" 10x   Toe yoga Stand 5" 15x 15x 15x 15x Standing 15   Foot doming Stand 5" 15x 5" 15x 5" 15x 15x 5" Standing 3" 15x   SLS=airex Airex 20" 5x 15" 4x Airex 20" 4x ea. 4x 20" Airex 20" 6x   Toe curls Stand 5" 15x 5"15x 5" 15x 20x 5" Standing 3" 15x   Tandem walking on airex 5 laps  5 laps 5 laps            Ther Ex        Ankle arom Standing wobble board 20x ea. 20x ea 20x 20x  Standing wobble board 20x ea.     Standing gastroc stretch   10" 10x home    side lying hip abduction  Side lying 20x  20x  20x   NuStep L6 9 min L6 6 min L6 8 min 8m L6 L6 8 min   Hip hiking 3" 15x 3" 15x 3" 15x 15x 3" 3" 15x   Standing HR w/ weighted ball 3# 20x  3# 15x 3# 20x  3# 20x   Eccentric HR 2" step 20x    2" step 2x15   Ther Activity                Gait Training                Modalities

## 2023-12-06 ENCOUNTER — OFFICE VISIT (OUTPATIENT)
Dept: PHYSICAL THERAPY | Facility: CLINIC | Age: 78
End: 2023-12-06
Payer: MEDICARE

## 2023-12-06 DIAGNOSIS — M79.672 LEFT FOOT PAIN: Primary | ICD-10-CM

## 2023-12-06 PROCEDURE — 97112 NEUROMUSCULAR REEDUCATION: CPT

## 2023-12-06 PROCEDURE — 97110 THERAPEUTIC EXERCISES: CPT

## 2023-12-06 PROCEDURE — 97140 MANUAL THERAPY 1/> REGIONS: CPT

## 2023-12-06 NOTE — PROGRESS NOTES
Daily Note     Today's date: 2023  Patient name: Desiree Srivastava  : 1945  MRN: [id number]  Referring provider: Laura Steele MD  Dx:   Encounter Diagnosis     ICD-10-CM    1. Left foot pain  M79.672           Start Time: 0800  Stop Time: 0900  Total time in clinic (min): 60 minutes    Subjective: Pt expresses frustration at the slow progress with her L heel. She would like to increase to 2x per week. Objective: See treatment diary below      Assessment: Tolerated treatment well. Patient demonstrated fatigue post treatmentPTA added bridge w add. LE stretch was provided; hs,gastroc,opp k-c,piriformis. Plan: Continue per plan of care. Increase to 2x per week per PT ok. Precautions: Hx of R MANDY 2023      Date  Reassess 12/6 11/10 11/15 11/21   FOTO     47 SC   Manuals        STM to gastroc 5 min Ds+hs 8 min Ds_ stm gastroc 5 min SC   Passive gastroc stretch 30" 5x ds  ds 30" 5x           Neuro Re-Ed        Clamshells  10x 10" 5" 15x 10x 10" 10" 10x   Toe yoga- stand Stand 5" 15x 15x 15x 15x Standing 15   Foot doming-stand Stand 5" 15x 5" 15x 5" 15x 15x 5" Standing 3" 15x   SLS-foam Airex 20" 5x 15" 4x Airex 20" 4x ea. 4x 20" Airex 20" 6x   Toe curls-stand Stand 5" 15x 5"15x 5" 15x 20x 5" Standing 3" 15x   Tandem walking on airex 5 laps 5 laps f/b 5 laps 5 laps    Side to side-airex        Ther Ex        Wobble Board-stand Standing wobble board 20x ea. 20x ea 20x 20x  Standing wobble board 20x ea.     Bridge c add  15x 5"      NuStep L6 9 min 8m L6 L6 8 min 8m L6 L6 8 min   Hip hiking b/l 3" 15x 3" 15x 3" 15x 15x 3" 3" 15x   Standing HR w/ weighted ball 3# 20x 3# 20x  3# 15x 3# 20x  3# 20x   Eccentric HR 2" step 20x 2" 20x   2" step 2x15   Ther Activity                Gait Training                Modalities

## 2023-12-11 ENCOUNTER — APPOINTMENT (OUTPATIENT)
Dept: PHYSICAL THERAPY | Facility: CLINIC | Age: 78
End: 2023-12-11
Payer: MEDICARE

## 2023-12-13 ENCOUNTER — APPOINTMENT (OUTPATIENT)
Dept: PHYSICAL THERAPY | Facility: CLINIC | Age: 78
End: 2023-12-13
Payer: MEDICARE

## 2023-12-16 ENCOUNTER — OFFICE VISIT (OUTPATIENT)
Dept: URGENT CARE | Facility: CLINIC | Age: 78
End: 2023-12-16
Payer: MEDICARE

## 2023-12-16 VITALS
SYSTOLIC BLOOD PRESSURE: 134 MMHG | TEMPERATURE: 98 F | HEART RATE: 96 BPM | DIASTOLIC BLOOD PRESSURE: 68 MMHG | OXYGEN SATURATION: 99 % | RESPIRATION RATE: 18 BRPM

## 2023-12-16 DIAGNOSIS — M62.838 TRAPEZIUS MUSCLE SPASM: Primary | ICD-10-CM

## 2023-12-16 PROCEDURE — 99213 OFFICE O/P EST LOW 20 MIN: CPT

## 2023-12-16 PROCEDURE — G0463 HOSPITAL OUTPT CLINIC VISIT: HCPCS

## 2023-12-16 RX ORDER — BACLOFEN 10 MG/1
10 TABLET ORAL 3 TIMES DAILY PRN
Qty: 21 TABLET | Refills: 0 | Status: SHIPPED | OUTPATIENT
Start: 2023-12-16

## 2023-12-16 NOTE — PROGRESS NOTES
[facility]        NAME: Damian Denton is a 78 y.o. female  : 1945    MRN: [id number]  DATE: 2023  TIME: 2:53 PM    Assessment and Plan   Trapezius muscle spasm [M62.838]  1. Trapezius muscle spasm  baclofen 10 mg tablet        Muscle spasm to left anterior trapezius muscle of the back -just medial to the scapula. Patient was doing a lot of pushing/pulling and work at her [employer]. There is palpable spasm on exam.  Normal range of motion of the left arm. Will start on a low-dose of baclofen that she was educated on this medication side effects. Advised follow-up with family doctor if no improvement. Advised to go to the ER if any symptoms worsen. Patient Instructions     Take prescribed medication as instructed. This medication may make you drowsy. Do not take prior to driving. Make sure to stay well-hydrated and rest.  Stretching as instructed. If no improvement, follow-up with your family doctor. Go to the emergency room if any symptoms worsen. Follow up with PCP in 3-5 days. Proceed to  ER if symptoms worsen. Chief Complaint     Chief Complaint   Patient presents with    Back Pain     Started 1 day ago  Left posterior upper back pain  OTC aleve         History of Present Illness       78-year-old female presents to the clinic with left upper posterior back pain. PT states that over the last few days/weeks, she was doing a lot of work at her [employer] and pushing and pulling and moving objects. She denies any fall or trauma. No prior injury or surgery. Taking over-the-counter NSAIDs without relief as well as applying ice and heat. Denies any fever, chills, numbness, tingling, chest pain, shortness of breath. Back Pain        Review of Systems   Review of Systems   Constitutional: Negative. HENT: Negative. Respiratory: Negative. Cardiovascular: Negative. Musculoskeletal:  Positive for back pain and myalgias. Skin: Negative.     Neurological: Negative. Current Medications       Current Outpatient Medications:     ascorbic acid (VITAMIN C) 1000 MG tablet, Take 1,000 mg by mouth daily, Disp: , Rfl:     baclofen 10 mg tablet, Take 1 tablet (10 mg total) by mouth 3 (three) times a day as needed for muscle spasms, Disp: 21 tablet, Rfl: 0    lisinopril (ZESTRIL) 20 mg tablet, Take 20 mg by mouth daily, Disp: , Rfl:     simvastatin (ZOCOR) 40 mg tablet, Take 40 mg by mouth every evening, Disp: , Rfl:     famotidine (PEPCID) 20 mg tablet, TAKE 1 TABLET BY MOUTH NIGHTLY AS NEEDED FOR HEARTBURN. (Patient not taking: Reported on 12/16/2023), Disp: , Rfl:     Current Allergies     Allergies as of 12/16/2023    (No Known Allergies)            The following portions of the patient's history were reviewed and updated as appropriate: allergies, current medications, past family history, past medical history, past social history, past surgical history and problem list.     Past Medical History:   Diagnosis Date    GERD (gastroesophageal reflux disease)     Hyperlipidemia     Hypertension        Past Surgical History:   Procedure Laterality Date    ADENOIDECTOMY      HYSTERECTOMY W/ SALPINGO-OOPHERECTOMY      TONSILLECTOMY         History reviewed. No pertinent family history. Medications have been verified. Objective   /68   Pulse 96   Temp 98 °F (36.7 °C)   Resp 18   SpO2 99%        Physical Exam     Physical Exam  Constitutional:       General: She is not in acute distress. Appearance: Normal appearance. She is not ill-appearing or diaphoretic. HENT:      Head: Normocephalic and atraumatic. Eyes:      Extraocular Movements: Extraocular movements intact. Pupils: Pupils are equal, round, and reactive to light. Cardiovascular:      Rate and Rhythm: Normal rate and regular rhythm. Pulses: Normal pulses. Heart sounds: Normal heart sounds. Pulmonary:      Effort: Pulmonary effort is normal. No respiratory distress. Breath sounds: Normal breath sounds. Musculoskeletal:      Cervical back: Spasms and tenderness present. Thoracic back: Spasms and tenderness present. Back:       Comments: Spasms and tenderness from the posterior trapezius radiating downwards just medial to the left scapula as well as left base of cervical spine. Pain worse with flexion, extension, twisting of the spine. Mild reproducible pain with flexion, abduction/adduction, internal/external rotation of the left shoulder and arm. No rash, erythema, ecchymosis, swelling, wound, deformity. Skin:     General: Skin is warm and dry. Capillary Refill: Capillary refill takes less than 2 seconds. Findings: No rash. Neurological:      General: No focal deficit present. Mental Status: She is alert and oriented to person, place, and time. Mental status is at baseline.    Psychiatric:         Mood and Affect: Mood normal.         Behavior: Behavior normal.

## 2023-12-16 NOTE — PATIENT INSTRUCTIONS
Take prescribed medication as instructed. This medication may make you drowsy. Do not take prior to driving. Make sure to stay well-hydrated and rest.  Stretching as instructed. If no improvement, follow-up with your family doctor. Go to the emergency room if any symptoms worsen. Follow up with PCP in 3-5 days. Proceed to  ER if symptoms worsen. Muscle Spasm   WHAT YOU NEED TO KNOW:   A muscle spasm is a sudden contraction of any muscle or group of muscles. A muscle cramp is a painful muscle spasm. Muscle cramps commonly occur after intense exercise or during pregnancy. They may also be caused by certain medications, dehydration, low calcium or magnesium levels, or another medical condition. DISCHARGE INSTRUCTIONS:   Medicines: You may need the following:  NSAIDs  help decrease swelling and pain or fever. This medicine is available with or without a doctor's order. NSAIDs can cause stomach bleeding or kidney problems in certain people. If you take blood thinner medicine, always ask your healthcare provider if NSAIDs are safe for you. Always read the medicine label and follow directions. Take your medicine as directed. Contact your healthcare provider if you think your medicine is not helping or if you have side effects. Tell your provider if you are allergic to any medicine. Keep a list of the medicines, vitamins, and herbs you take. Include the amounts, and when and why you take them. Bring the list or the pill bottles to follow-up visits. Carry your medicine list with you in case of an emergency. Follow up with your healthcare provider as directed: You may need other tests or treatment. You may also be referred to a physical therapist or other specialist. Write down your questions so you remember to ask them during your visits. Self-care:   Stretch  your muscle to help relieve the cramp. It may be helpful to keep your muscle in the stretched position until the cramp is gone.      Apply heat  to help decrease pain and muscle spasms. Apply heat on the area for 20 to 30 minutes every 2 hours for as many days as directed. Apply ice  to help decrease swelling and pain. Ice may also help prevent tissue damage. Use an ice pack, or put crushed ice in a plastic bag. Cover it with a towel and place it on your muscle for 15 to 20 minutes every hour or as directed. Drink more liquids  to help prevent muscle cramps caused by dehydration. Sports drinks may help replace electrolytes you lose through sweat during exercise. Ask your healthcare provider how much liquid to drink each day and which liquids are best for you. Eat healthy foods , such as fruits, vegetables, whole grains, low-fat dairy products, and lean proteins (meat, beans, and fish). If you are pregnant, ask your healthcare provider about foods that are high in magnesium and sodium. They may help to relieve cramps during pregnancy. Massage your muscle  to help relieve the cramp. Take frequent deep breaths  until the cramp feels better. Lie down while you take the deep breaths so you do not get dizzy or lightheaded. Contact your healthcare provider if:   You have signs of dehydration, such as a headache, dark yellow urine, dry eyes or mouth, or a fast heartbeat. You have questions or concerns about your condition or care. Return to the emergency department if:   You have warmth, swelling, or redness in the cramping muscle. You have frequent or unrelieved muscle cramps in several different muscles. You have muscle cramps with numbness, tingling, and burning in your hands and feet. © Copyright [facility] 2023 Information is for End User's use only and may not be sold, redistributed or otherwise used for commercial purposes. The above information is an  only. It is not intended as medical advice for individual conditions or treatments.  Talk to your doctor, nurse or pharmacist before following any medical regimen to see if it is safe and effective for you.

## 2023-12-18 ENCOUNTER — APPOINTMENT (OUTPATIENT)
Dept: PHYSICAL THERAPY | Facility: CLINIC | Age: 78
End: 2023-12-18
Payer: MEDICARE

## 2023-12-20 ENCOUNTER — APPOINTMENT (OUTPATIENT)
Dept: PHYSICAL THERAPY | Facility: CLINIC | Age: 78
End: 2023-12-20
Payer: MEDICARE

## 2023-12-26 ENCOUNTER — APPOINTMENT (OUTPATIENT)
Dept: PHYSICAL THERAPY | Facility: CLINIC | Age: 78
End: 2023-12-26
Payer: MEDICARE

## 2023-12-28 ENCOUNTER — APPOINTMENT (OUTPATIENT)
Dept: PHYSICAL THERAPY | Facility: CLINIC | Age: 78
End: 2023-12-28
Payer: MEDICARE

## 2023-12-30 ENCOUNTER — OFFICE VISIT (OUTPATIENT)
Dept: URGENT CARE | Facility: CLINIC | Age: 78
End: 2023-12-30
Payer: MEDICARE

## 2023-12-30 VITALS
RESPIRATION RATE: 18 BRPM | WEIGHT: 156 LBS | HEIGHT: 63 IN | TEMPERATURE: 98.3 F | OXYGEN SATURATION: 97 % | BODY MASS INDEX: 27.64 KG/M2 | HEART RATE: 101 BPM

## 2023-12-30 DIAGNOSIS — S46.912A MUSCLE STRAIN OF LEFT SCAPULAR REGION, INITIAL ENCOUNTER: Primary | ICD-10-CM

## 2023-12-30 PROCEDURE — G0463 HOSPITAL OUTPT CLINIC VISIT: HCPCS | Performed by: PHYSICIAN ASSISTANT

## 2023-12-30 PROCEDURE — 99213 OFFICE O/P EST LOW 20 MIN: CPT | Performed by: PHYSICIAN ASSISTANT

## 2023-12-30 RX ORDER — MELOXICAM 15 MG/1
15 TABLET ORAL DAILY
Qty: 10 TABLET | Refills: 0 | Status: SHIPPED | OUTPATIENT
Start: 2023-12-30

## 2023-12-30 NOTE — PATIENT INSTRUCTIONS
Take Mobic as prescribed  (this may increase your blood pressure)   Do not take Mobic with other NSAIDs  Rest (for no longer than 24 hours)  Stretching exercises  Heat  Follow up with [facility]  Follow up with PCP in 3-5 days.  Proceed to  ER if symptoms worsen.    [facility] will typically call patient within 48 hours. You may call [facility]: [phone].

## 2023-12-30 NOTE — PROGRESS NOTES
[facility]        NAME: Renee Curry is a 78 y.o. female  : 1945    MRN: [id number]  DATE: 2023  TIME: 3:09 PM    Assessment and Plan   Muscle strain of left scapular region, initial encounter [Q17.826Q]  1. Muscle strain of left scapular region, initial encounter  Ambulatory Referral to [facility]    meloxicam (Mobic) 15 mg tablet          23: eGFR 65; AST/ALT WNL    Patient Instructions     Take Mobic as prescribed  (this may increase your blood pressure)   Do not take Mobic with other NSAIDs  Rest (for no longer than 24 hours)  Stretching exercises  Heat  Follow up with [facility]  Follow up with PCP in 3-5 days.  Proceed to  ER if symptoms worsen.    [facility] will typically call patient within 48 hours. You may call [facility]: [phone].     Chief Complaint     Chief Complaint   Patient presents with    Back Pain     Patient presents with back pain that was treated in clinic 2 weeks ago but medication has not been working.          History of Present Illness       Patient states pain started after heavy lifting and stretching up to reach something on a high shelf. Denies chest pain, cough, SOB or hemoptysis.     Back Pain  This is a new problem. The current episode started 1 to 4 weeks ago. The pain is present in the thoracic spine (L). The pain does not radiate. The pain is moderate. Exacerbated by: movement; at night. Pertinent negatives include no abdominal pain, bladder incontinence, bowel incontinence, chest pain, fever, headaches, numbness, paresthesias, tingling, weakness or weight loss. She has tried NSAIDs (baclofen; warm shower) for the symptoms.       Review of Systems   Review of Systems   Constitutional:  Negative for chills, fever and weight loss.   Respiratory:  Negative for cough and shortness of breath.    Cardiovascular:  Negative for chest pain and palpitations.   Gastrointestinal:  Negative for abdominal  "pain, anal bleeding, blood in stool, bowel incontinence, constipation, diarrhea and nausea.   Genitourinary:  Negative for bladder incontinence.   Musculoskeletal:  Positive for back pain. Negative for arthralgias, joint swelling and myalgias.   Skin:  Negative for color change, rash and wound.   Neurological:  Negative for tingling, weakness, light-headedness, numbness, headaches and paresthesias.         Current Medications       Current Outpatient Medications:     ascorbic acid (VITAMIN C) 1000 MG tablet, Take 1,000 mg by mouth daily, Disp: , Rfl:     baclofen 10 mg tablet, Take 1 tablet (10 mg total) by mouth 3 (three) times a day as needed for muscle spasms, Disp: 21 tablet, Rfl: 0    lisinopril (ZESTRIL) 20 mg tablet, Take 20 mg by mouth daily, Disp: , Rfl:     meloxicam (Mobic) 15 mg tablet, Take 1 tablet (15 mg total) by mouth daily, Disp: 10 tablet, Rfl: 0    simvastatin (ZOCOR) 40 mg tablet, Take 40 mg by mouth every evening, Disp: , Rfl:     famotidine (PEPCID) 20 mg tablet, TAKE 1 TABLET BY MOUTH NIGHTLY AS NEEDED FOR HEARTBURN. (Patient not taking: Reported on 12/16/2023), Disp: , Rfl:     Current Allergies     Allergies as of 12/30/2023 - Reviewed 12/30/2023   Allergen Reaction Noted    Vancomycin Itching 06/13/2023            The following portions of the patient's history were reviewed and updated as appropriate: allergies, current medications, past family history, past medical history, past social history, past surgical history and problem list.     Past Medical History:   Diagnosis Date    GERD (gastroesophageal reflux disease)     Hyperlipidemia     Hypertension        Past Surgical History:   Procedure Laterality Date    ADENOIDECTOMY      HYSTERECTOMY W/ SALPINGO-OOPHERECTOMY      TONSILLECTOMY         History reviewed. No pertinent family history.      Medications have been verified.        Objective   Pulse 101   Temp 98.3 °F (36.8 °C) (Temporal)   Resp 18   Ht 5' 3\" (1.6 m)   Wt 70.8 kg " (156 lb)   SpO2 97%   BMI 27.63 kg/m²   No LMP recorded. Patient is postmenopausal.       Physical Exam     Physical Exam  Vitals reviewed.   Constitutional:       General: She is not in acute distress.     Appearance: She is well-developed.   Cardiovascular:      Rate and Rhythm: Normal rate and regular rhythm.      Heart sounds: Normal heart sounds. No murmur heard.     No friction rub. No gallop.   Pulmonary:      Effort: Pulmonary effort is normal. No respiratory distress.      Breath sounds: Normal breath sounds. No wheezing, rhonchi or rales.   Abdominal:      Palpations: Abdomen is soft.   Musculoskeletal:         General: Tenderness present. No deformity. Normal range of motion.   Skin:     General: Skin is warm.          Neurological:      Mental Status: She is alert and oriented to person, place, and time.      Coordination: Coordination normal.      Deep Tendon Reflexes: Reflexes are normal and symmetric. Reflexes normal.   Psychiatric:         Behavior: Behavior normal.         Thought Content: Thought content normal.         Judgment: Judgment normal.

## 2024-01-03 ENCOUNTER — TELEPHONE (OUTPATIENT)
Dept: PHYSICAL THERAPY | Facility: OTHER | Age: 79
End: 2024-01-03

## 2024-01-03 ENCOUNTER — APPOINTMENT (EMERGENCY)
Dept: RADIOLOGY | Facility: HOSPITAL | Age: 79
End: 2024-01-03
Payer: MEDICARE

## 2024-01-03 ENCOUNTER — HOSPITAL ENCOUNTER (EMERGENCY)
Facility: HOSPITAL | Age: 79
Discharge: HOME | End: 2024-01-03
Attending: EMERGENCY MEDICINE
Payer: MEDICARE

## 2024-01-03 VITALS
HEART RATE: 97 BPM | RESPIRATION RATE: 16 BRPM | TEMPERATURE: 97.5 F | OXYGEN SATURATION: 99 % | DIASTOLIC BLOOD PRESSURE: 84 MMHG | SYSTOLIC BLOOD PRESSURE: 196 MMHG

## 2024-01-03 DIAGNOSIS — S29.012A STRAIN OF LEFT RHOMBOID MUSCLE: Primary | ICD-10-CM

## 2024-01-03 DIAGNOSIS — M62.838 TRAPEZIUS MUSCLE SPASM: ICD-10-CM

## 2024-01-03 PROCEDURE — 73030 X-RAY EXAM OF SHOULDER: CPT

## 2024-01-03 PROCEDURE — 99283 EMERGENCY DEPT VISIT LOW MDM: CPT

## 2024-01-03 PROCEDURE — 73020 X-RAY EXAM OF SHOULDER: CPT

## 2024-01-03 PROCEDURE — 99284 EMERGENCY DEPT VISIT MOD MDM: CPT | Performed by: EMERGENCY MEDICINE

## 2024-01-03 RX ORDER — METHYLPREDNISOLONE 4 MG/1
TABLET ORAL
Qty: 21 TABLET | Refills: 0 | Status: SHIPPED | OUTPATIENT
Start: 2024-01-03

## 2024-01-03 RX ORDER — LIDOCAINE 50 MG/G
1 PATCH TOPICAL ONCE
Status: DISCONTINUED | OUTPATIENT
Start: 2024-01-03 | End: 2024-01-03 | Stop reason: HOSPADM

## 2024-01-03 RX ADMIN — LIDOCAINE 5% 1 PATCH: 700 PATCH TOPICAL at 12:18

## 2024-01-03 NOTE — ED PROVIDER NOTES
History  Chief Complaint   Patient presents with    Shoulder Pain     Patient states she over extended her left shoulder on December 16th and has had increased pain since. She was seen by urgent care but did not start ordered medication.  Last Aleve was taken at 0300 this am.      Patient is a pleasant 78-year-old female presenting with left posterior shoulder/trapezius/rhomboid pain.  Pain is been present x 3 weeks.  Patient states she was lifting heavy objects preparing for [employer] which included heavy trays of bread as well as other boxes.  Was seen at urgent care.  Has also been seen by her chiropractor.  Denies any chest pain, shortness of breath, diaphoresis, rash, nausea or vomiting.  Patient is right-hand dominant.        Prior to Admission Medications   Prescriptions Last Dose Informant Patient Reported? Taking?   ascorbic acid (VITAMIN C) 1000 MG tablet 1/3/2024  Yes Yes   Sig: Take 1,000 mg by mouth daily   baclofen 10 mg tablet Not Taking  No No   Sig: Take 1 tablet (10 mg total) by mouth 3 (three) times a day as needed for muscle spasms   Patient not taking: Reported on 1/3/2024   famotidine (PEPCID) 20 mg tablet Not Taking  Yes No   Sig: TAKE 1 TABLET BY MOUTH NIGHTLY AS NEEDED FOR HEARTBURN.   Patient not taking: Reported on 12/16/2023   lisinopril (ZESTRIL) 20 mg tablet 1/3/2024  Yes Yes   Sig: Take 20 mg by mouth daily   meloxicam (Mobic) 15 mg tablet Not Taking  No No   Sig: Take 1 tablet (15 mg total) by mouth daily   Patient not taking: Reported on 1/3/2024   simvastatin (ZOCOR) 40 mg tablet 1/2/2024  Yes Yes   Sig: Take 40 mg by mouth every evening      Facility-Administered Medications: None       Past Medical History:   Diagnosis Date    GERD (gastroesophageal reflux disease)     Hyperlipidemia     Hypertension        Past Surgical History:   Procedure Laterality Date    ADENOIDECTOMY      HYSTERECTOMY W/ SALPINGO-OOPHERECTOMY      TONSILLECTOMY         History reviewed. No pertinent family  history.  I have reviewed and agree with the history as documented.    E-Cigarette/Vaping     E-Cigarette/Vaping Substances     Social History     Tobacco Use    Smoking status: Some Days     Current packs/day: 0.50     Types: Cigarettes    Smokeless tobacco: Never   Substance Use Topics    Alcohol use: Never    Drug use: Never       Review of Systems   Constitutional:  Negative for chills and fever.   HENT:  Negative for ear pain and sore throat.    Eyes:  Negative for pain and visual disturbance.   Respiratory:  Negative for cough and shortness of breath.    Cardiovascular:  Negative for chest pain and palpitations.   Gastrointestinal:  Negative for abdominal pain and vomiting.   Genitourinary:  Negative for dysuria and hematuria.   Musculoskeletal:  Negative for arthralgias and back pain.        Left trapezius/rhomboid pain as per HPI   Skin:  Negative for color change and rash.   Neurological:  Negative for seizures and syncope.   All other systems reviewed and are negative.      Physical Exam  Physical Exam  Vitals and nursing note reviewed.   Constitutional:       General: She is not in acute distress.     Appearance: Normal appearance. She is well-developed and normal weight. She is not ill-appearing, toxic-appearing or diaphoretic.   HENT:      Head: Normocephalic and atraumatic.      Nose: Nose normal.   Eyes:      General: No scleral icterus.     Extraocular Movements: Extraocular movements intact.      Conjunctiva/sclera: Conjunctivae normal.   Cardiovascular:      Rate and Rhythm: Normal rate and regular rhythm.      Pulses: Normal pulses.      Heart sounds: Normal heart sounds. No murmur heard.  Pulmonary:      Effort: Pulmonary effort is normal. No respiratory distress.      Breath sounds: Normal breath sounds. No wheezing or rales.   Chest:      Chest wall: No tenderness.   Abdominal:      General: Bowel sounds are normal. There is no distension.      Palpations: Abdomen is soft.      Tenderness:  There is no abdominal tenderness. There is no right CVA tenderness, left CVA tenderness, guarding or rebound.   Musculoskeletal:         General: Tenderness present. No deformity. Normal range of motion.        Arms:       Cervical back: Normal range of motion and neck supple. No rigidity or tenderness.      Right lower leg: No edema.      Left lower leg: No edema.      Comments: Point tenderness of the medial aspect of the left scapula or rhomboid inserts.   Lymphadenopathy:      Cervical: No cervical adenopathy.   Skin:     General: Skin is warm and dry.      Capillary Refill: Capillary refill takes less than 2 seconds.      Coloration: Skin is not jaundiced.      Findings: No bruising, erythema, lesion or rash.   Neurological:      General: No focal deficit present.      Mental Status: She is alert and oriented to person, place, and time. Mental status is at baseline.   Psychiatric:         Mood and Affect: Mood normal.         Behavior: Behavior normal.         Vital Signs  ED Triage Vitals [01/03/24 1011]   Temperature Pulse Respirations Blood Pressure SpO2   97.5 °F (36.4 °C) 97 16 (!) 196/84 99 %      Temp Source Heart Rate Source Patient Position - Orthostatic VS BP Location FiO2 (%)   Temporal Monitor Sitting Right arm --      Pain Score       4           Vitals:    01/03/24 1011   BP: (!) 196/84   Pulse: 97   Patient Position - Orthostatic VS: Sitting         Visual Acuity      ED Medications  Medications   lidocaine (LIDODERM) 5 % patch 1 patch (has no administration in time range)       Diagnostic Studies  Results Reviewed       None                   XR shoulder 2+ views LEFT    (Results Pending)   XR shoulder 1 vw right    (Results Pending)              Procedures  ECG 12 Lead Documentation Only    Date/Time: 1/3/2024 12:17 PM    Performed by: Titi Rees DO  Authorized by: Titi Rees DO    Indications / Diagnosis:  Left shoulder pain  ECG reviewed by me, the ED Provider: yes     Patient location:  ED  Rate:     ECG rate:  88    ECG rate assessment: normal    Rhythm:     Rhythm: sinus rhythm    Ectopy:     Ectopy: none    QRS:     QRS axis:  Indeterminate  Conduction:     Conduction: abnormal      Abnormal conduction: incomplete RBBB    ST segments:     ST segments:  Normal  T waves:     T waves: non-specific             ED Course                               SBIRT 20yo+      Flowsheet Row Most Recent Value   Initial Alcohol Screen: US AUDIT-C     1. How often do you have a drink containing alcohol? 0 Filed at: 01/03/2024 1010   2. How many drinks containing alcohol do you have on a typical day you are drinking?  0 Filed at: 01/03/2024 1010   3a. Male UNDER 65: How often do you have five or more drinks on one occasion? 0 Filed at: 01/03/2024 1010   3b. FEMALE Any Age, or MALE 65+: How often do you have 4 or more drinks on one occassion? 0 Filed at: 01/03/2024 1010   Audit-C Score 0 Filed at: 01/03/2024 1010   HAYES: How many times in the past year have you...    Used an illegal drug or used a prescription medication for non-medical reasons? Never Filed at: 01/03/2024 1010                      Medical Decision Making  6-year-old female presenting with left trapezius/rhomboid pain x 3 weeks.    Problems Addressed:  Strain of left rhomboid muscle: acute illness or injury  Trapezius muscle spasm: acute illness or injury    Amount and/or Complexity of Data Reviewed  Radiology: ordered and independent interpretation performed. Decision-making details documented in ED Course.    Risk  OTC drugs.  Prescription drug management.  Risk Details: Amatory referral to [facility] program.  Medrol Dosepak.  NSAIDs.  PT follow-up.  Denies any chest pain or shortness of breath.             Disposition  Final diagnoses:   Strain of left rhomboid muscle   Trapezius muscle spasm - left     Time reflects when diagnosis was documented in both MDM as applicable and the Disposition within this note        Time User Action Codes Description Comment    1/3/2024 12:08 PM Titi Rees Add [S29.012A] Strain of left rhomboid muscle     1/3/2024 12:08 PM Titi Rees Add [M62.838] Trapezius muscle spasm     1/3/2024 12:08 PM Titi Rees Modify [M62.838] Trapezius muscle spasm left          ED Disposition       ED Disposition   Discharge    Condition   Stable    Date/Time   Wed Gustavo 3, 2024 1208    Comment   Renee Curry discharge to home/self care.                   Follow-up Information       Follow up With Specialties Details Why Contact Info    [facility]  Schedule an appointment as soon as possible for a visit   [phone]              Patient's Medications   Discharge Prescriptions    DICLOFENAC SODIUM (VOLTAREN) 50 MG EC TABLET    Take 1 tablet (50 mg total) by mouth 2 (two) times a day as needed (moderate pain) Do not start taking this medication until Medrol dose pack is completed       Start Date: 1/3/2024  End Date: --       Order Dose: 50 mg       Quantity: 60 tablet    Refills: 0    METHYLPREDNISOLONE 4 MG TABLET THERAPY PACK    Use as directed on package       Start Date: 1/3/2024  End Date: --       Order Dose: --       Quantity: 21 tablet    Refills: 0           PDMP Review       None            ED Provider  Electronically Signed by             Titi Rees DO  01/03/24 2413

## 2024-01-03 NOTE — TELEPHONE ENCOUNTER
Patient called into [facility] 1/3/24 @ 8:15AM    I explained [facility] to her and that we would send her for an eval with the advanced spine therapist.     She is requesting an MRI and medication. I suggested she see her PCP. She stated she did call them, but they can not get her in until next week.     I suggested if the pain is that severe she proceed to the ED for eval    [facility] closed  Will assist patient if she calls us back and wants PT eval

## 2024-01-08 ENCOUNTER — TELEPHONE (OUTPATIENT)
Dept: PHYSICAL THERAPY | Facility: OTHER | Age: 79
End: 2024-01-08

## 2024-04-12 ENCOUNTER — EVALUATION (OUTPATIENT)
Dept: PHYSICAL THERAPY | Facility: CLINIC | Age: 79
End: 2024-04-12
Payer: MEDICARE

## 2024-04-12 DIAGNOSIS — M54.12 CERVICAL RADICULOPATHY: Primary | ICD-10-CM

## 2024-04-12 PROCEDURE — 97161 PT EVAL LOW COMPLEX 20 MIN: CPT | Performed by: PHYSICAL THERAPIST

## 2024-04-12 PROCEDURE — 97140 MANUAL THERAPY 1/> REGIONS: CPT | Performed by: PHYSICAL THERAPIST

## 2024-04-12 PROCEDURE — 97110 THERAPEUTIC EXERCISES: CPT | Performed by: PHYSICAL THERAPIST

## 2024-04-12 NOTE — PROGRESS NOTES
PT Evaluation     Today's date: 2024  Patient name: Renee Curry  : 1945  MRN: [id number]  Referring provider: Ole Gregorio MD  Dx:   Encounter Diagnosis     ICD-10-CM    1. Cervical radiculopathy  M54.12           Start Time: 0900  Stop Time: 1000  Total time in clinic (min): 60 minutes    Assessment  Assessment details: Patient is an 77 y/o female with chief c/o L cervical radiculopathy. She has noted tenderness to the L rhomboids. There is decreased sensation to the 1st through lateral half of the 4th digits into the palmar aspect of the L hand. There was positive ULTT 1A today along with decreased cervical rotation to the L and positive spurlings test. There was good tolerance for cervical distraction with reports of warm sensation into the L hand which she notes that this area has been cold since the onset of symptoms. She did demonstrate improved L UE mobility through ULTT 1A post median nerve glides. She was educated on median nerve glide and cervical retractions for home program.   Impairments: abnormal or restricted ROM, activity intolerance, impaired physical strength, lacks appropriate home exercise program, pain with function and poor posture     Symptom irritability: moderateUnderstanding of Dx/Px/POC: good   Prognosis: good    Goals  STGs:  Patient will be independent with hep by 2-3 visits.   Decrease pain levels by 25% for improved tolerance with adls by 2-4 weeks.   Improve cervical rom to wnl for improved tolerance with adls by 2-4 weeks.   Improve B/L UE rom to wnl for improved tolerance with adls by 2-4 weeks.     LTGs:  Improve FOTO score from 47 to 60 indicating improved tolerance with activities involving the cervical spine and B/L UEs by discharge.   Patient will be able to return to normal work and recreation without limitation from the cervical spine by discharge.   Patient will be able to perform all adls with pain levels not exceeding 2/10 by discharge.   Patient will be  able to return to normal home duties without limitation from the cervical spine by discharge.             Plan  Plan details: Patient informed that from this point forward, to ensure adherence to the aforementioned plan of care, all or some of the treatment may be performed and carried out by a Physical Therapy Assistant (PTA) with supervision from a licensed Physical Therapist (PT) in accordance with [facility] Physical Therapy Practice Act.  Patient will continue to benefit from skilled physical therapy to address the functional deficits that were identified during the evaluation today. We will continue to progress the therapy program to address these functional deficits and achieve the established goals.           Patient would benefit from: skilled physical therapy  Planned modality interventions: traction, electrical stimulation/Russian stimulation and thermotherapy: hydrocollator packs  Planned therapy interventions: ADL retraining, body mechanics training, flexibility, functional ROM exercises, home exercise program, therapeutic exercise, therapeutic activities, stretching, strengthening, postural training, patient education, manual therapy and joint mobilization  Frequency: 2x week  Duration in weeks: 4  Plan of Care beginning date: 4/12/2024  Plan of Care expiration date: 5/10/2024  Treatment plan discussed with: patient      Subjective Evaluation    History of Present Illness  Mechanism of injury: Patient presents to out patient physical therapy with chief c/o cervical radiculopathy. Patient reports that she had pain that started in her mid back on the L side back in December. She notes that she has since started to develop symptoms into her L arm and L hand. She feels that when she straightens her arm out the tingling will get worse. She also notes that by the end of the day her symptoms will seem worse. She feels that her hand and fingers are stiff.           Not a recurrent problem   Quality of  "life: good    Patient Goals  Patient goals for therapy: decreased pain, increased strength and return to sport/leisure activities  Patient goal: Patient wishe to be free of radicular symptoms and be able to resume her normal exercise routine without limitation.  Pain  Current pain rating: 3  At best pain rating: 3  At worst pain ratin  Location: Cervical, L periscapular region  Quality: radiating, needle-like, discomfort and dull ache    Hand dominance: right    Treatments  Previous treatment: chiropractic and medication      Objective     Static Posture     Head  Forward.    Shoulders  Rounded.    Thoracic Spine  Hyperkyphosis.    Palpation   Left   Tenderness of the rhomboids.     Neurological Testing     Sensation   Cervical/Thoracic   Left   Diminished: light touch    Right   Intact: light touch    Active Range of Motion   Cervical/Thoracic Spine       Cervical    Flexion: 38 degrees   Extension: 42 degrees     with pain  Left lateral flexion: 21 degrees      Right lateral flexion: 18 degrees     with pain  Left rotation: 57 degrees  Right rotation: 43 degrees    with pain    Joint Play     Hypomobile: C4, C5 and C6     Tests   Cervical   Positive vertical compression and cervical distraction test.    Left   Positive Spurling's Test A.     Left Shoulder   Positive ULTT1.     Lumbar   Positive vertical compression .              Precautions: R MANDY 2023         Date        FOTO 47 SC       Manuals        Cervical traction manual 5 min       Median nerve glide 5 min       Passive cervical retraction 30\" 5x       STM to L Rhomboids 5 min       Neuro Re-Ed                                                                Ther Ex        Supine cervical retractions 3x15                                                               Ther Activity                        Gait Training                        Modalities                               "

## 2024-04-12 NOTE — LETTER
2024    Ole Gregorio MD  [address] [place] [address]    Patient: Renee Curry   YOB: 1945   Date of Visit: 2024     Encounter Diagnosis     ICD-10-CM    1. Cervical radiculopathy  M54.12           Dear Dr. Gregorio:    Thank you for your recent referral of Renee Curry. Please review the attached evaluation summary from Renee's recent visit.     Please verify that you agree with the plan of care by signing the attached order.     If you have any questions or concerns, please do not hesitate to call.     I sincerely appreciate the opportunity to share in the care of one of your patients and hope to have another opportunity to work with you in the near future.       Sincerely,    Malik Edgar, PT      Referring Provider:      I certify that I have read the below Plan of Care and certify the need for these services furnished under this plan of treatment while under my care.                    Ole Gregorio MD  [address] [place] [address]  Via Fax: [phone]          PT Evaluation     Today's date: 2024  Patient name: Renee Curry  : 1945  MRN: [id number]  Referring provider: Ole Gregorio MD  Dx:   Encounter Diagnosis     ICD-10-CM    1. Cervical radiculopathy  M54.12           Start Time: 0900  Stop Time: 1000  Total time in clinic (min): 60 minutes    Assessment  Assessment details: Patient is an 77 y/o female with chief c/o L cervical radiculopathy. She has noted tenderness to the L rhomboids. There is decreased sensation to the 1st through lateral half of the 4th digits into the palmar aspect of the L hand. There was positive ULTT 1A today along with decreased cervical rotation to the L and positive spurlings test. There was good tolerance for cervical distraction with reports of warm sensation into the L hand which she notes that this area has been cold since the onset of symptoms. She did demonstrate improved L UE mobility through ULTT 1A post median  nerve glides. She was educated on median nerve glide and cervical retractions for home program.   Impairments: abnormal or restricted ROM, activity intolerance, impaired physical strength, lacks appropriate home exercise program, pain with function and poor posture     Symptom irritability: moderateUnderstanding of Dx/Px/POC: good   Prognosis: good    Goals  STGs:  Patient will be independent with hep by 2-3 visits.   Decrease pain levels by 25% for improved tolerance with adls by 2-4 weeks.   Improve cervical rom to wnl for improved tolerance with adls by 2-4 weeks.   Improve B/L UE rom to wnl for improved tolerance with adls by 2-4 weeks.     LTGs:  Improve FOTO score from 47 to 60 indicating improved tolerance with activities involving the cervical spine and B/L UEs by discharge.   Patient will be able to return to normal work and recreation without limitation from the cervical spine by discharge.   Patient will be able to perform all adls with pain levels not exceeding 2/10 by discharge.   Patient will be able to return to normal home duties without limitation from the cervical spine by discharge.             Plan  Plan details: Patient informed that from this point forward, to ensure adherence to the aforementioned plan of care, all or some of the treatment may be performed and carried out by a Physical Therapy Assistant (PTA) with supervision from a licensed Physical Therapist (PT) in accordance with [facility] Physical Therapy Practice Act.  Patient will continue to benefit from skilled physical therapy to address the functional deficits that were identified during the evaluation today. We will continue to progress the therapy program to address these functional deficits and achieve the established goals.           Patient would benefit from: skilled physical therapy  Planned modality interventions: traction, electrical stimulation/Russian stimulation and thermotherapy: hydrocollator packs  Planned  therapy interventions: ADL retraining, body mechanics training, flexibility, functional ROM exercises, home exercise program, therapeutic exercise, therapeutic activities, stretching, strengthening, postural training, patient education, manual therapy and joint mobilization  Frequency: 2x week  Duration in weeks: 4  Plan of Care beginning date: 2024  Plan of Care expiration date: 5/10/2024  Treatment plan discussed with: patient      Subjective Evaluation    History of Present Illness  Mechanism of injury: Patient presents to out patient physical therapy with chief c/o cervical radiculopathy. Patient reports that she had pain that started in her mid back on the L side back in December. She notes that she has since started to develop symptoms into her L arm and L hand. She feels that when she straightens her arm out the tingling will get worse. She also notes that by the end of the day her symptoms will seem worse. She feels that her hand and fingers are stiff.           Not a recurrent problem   Quality of life: good    Patient Goals  Patient goals for therapy: decreased pain, increased strength and return to sport/leisure activities  Patient goal: Patient wishe to be free of radicular symptoms and be able to resume her normal exercise routine without limitation.  Pain  Current pain rating: 3  At best pain rating: 3  At worst pain ratin  Location: Cervical, L periscapular region  Quality: radiating, needle-like, discomfort and dull ache    Hand dominance: right    Treatments  Previous treatment: chiropractic and medication      Objective     Static Posture     Head  Forward.    Shoulders  Rounded.    Thoracic Spine  Hyperkyphosis.    Palpation   Left   Tenderness of the rhomboids.     Neurological Testing     Sensation   Cervical/Thoracic   Left   Diminished: light touch    Right   Intact: light touch    Active Range of Motion   Cervical/Thoracic Spine       Cervical    Flexion: 38 degrees   Extension: 42  "degrees     with pain  Left lateral flexion: 21 degrees      Right lateral flexion: 18 degrees     with pain  Left rotation: 57 degrees  Right rotation: 43 degrees    with pain    Joint Play     Hypomobile: C4, C5 and C6     Tests   Cervical   Positive vertical compression and cervical distraction test.    Left   Positive Spurling's Test A.     Left Shoulder   Positive ULTT1.     Lumbar   Positive vertical compression .              Precautions: R MANDY June 2023         Date 4/12       FOTO 47 SC       Manuals        Cervical traction manual 5 min       Median nerve glide 5 min       Passive cervical retraction 30\" 5x       STM to L Rhomboids 5 min       Neuro Re-Ed                                                                Ther Ex        Supine cervical retractions 3x15                                                               Ther Activity                        Gait Training                        Modalities                                               "

## 2024-04-12 NOTE — LETTER
2024    Ole Gregorio MD  [address] [place] [address]    Patient: Renee Curry   YOB: 1945   Date of Visit: 2024     Encounter Diagnosis     ICD-10-CM    1. Chronic bilateral low back pain with bilateral sciatica  M54.42     M54.41     G89.29           Dear Dr. Gregorio:    Thank you for your recent referral of Renee Curry. Please review the attached evaluation summary from Renee's recent visit.     Please verify that you agree with the plan of care by signing the attached order.     If you have any questions or concerns, please do not hesitate to call.     I sincerely appreciate the opportunity to share in the care of one of your patients and hope to have another opportunity to work with you in the near future.       Sincerely,    Malik Edgar, PT      Referring Provider:      I certify that I have read the below Plan of Care and certify the need for these services furnished under this plan of treatment while under my care.                    Ole Gregorio MD  [address] [place] [address]  Via Fax: [phone]          PT Evaluation     Today's date: 2024  Patient name: Renee Curry  : 1945  MRN: [id number]  Referring provider: Ole Gregorio MD  Dx:   Encounter Diagnosis     ICD-10-CM    1. Chronic bilateral low back pain with bilateral sciatica  M54.42     M54.41     G89.29           Start Time: 0900  Stop Time: 1000  Total time in clinic (min): 60 minutes    Assessment  Assessment details: Patient is an 77 y/o female with chief c/o L cervical radiculopathy. She has noted tenderness to the L rhomboids. There is decreased sensation to the 1st through lateral half of the 4th digits into the palmar aspect of the L hand. There was positive ULTT 1A today along with decreased cervical rotation to the L and positive spurlings test. There was good tolerance for cervical distraction with reports of warm sensation into the L hand which she notes that this area has  been cold since the onset of symptoms. She did demonstrate improved L UE mobility through ULTT 1A post median nerve glides. She was educated on median nerve glide and cervical retractions for home program.   Impairments: abnormal or restricted ROM, activity intolerance, impaired physical strength, lacks appropriate home exercise program, pain with function and poor posture     Symptom irritability: moderateUnderstanding of Dx/Px/POC: good   Prognosis: good    Goals  STGs:  Patient will be independent with hep by 2-3 visits.   Decrease pain levels by 25% for improved tolerance with adls by 2-4 weeks.   Improve cervical rom to wnl for improved tolerance with adls by 2-4 weeks.   Improve B/L UE rom to wnl for improved tolerance with adls by 2-4 weeks.     LTGs:  Improve FOTO score from 47 to 60 indicating improved tolerance with activities involving the cervical spine and B/L UEs by discharge.   Patient will be able to return to normal work and recreation without limitation from the cervical spine by discharge.   Patient will be able to perform all adls with pain levels not exceeding 2/10 by discharge.   Patient will be able to return to normal home duties without limitation from the cervical spine by discharge.             Plan  Plan details: Patient informed that from this point forward, to ensure adherence to the aforementioned plan of care, all or some of the treatment may be performed and carried out by a Physical Therapy Assistant (PTA) with supervision from a licensed Physical Therapist (PT) in accordance with [facility] Physical Therapy Practice Act.  Patient will continue to benefit from skilled physical therapy to address the functional deficits that were identified during the evaluation today. We will continue to progress the therapy program to address these functional deficits and achieve the established goals.           Patient would benefit from: skilled physical therapy  Planned modality  interventions: traction, electrical stimulation/Russian stimulation and thermotherapy: hydrocollator packs  Planned therapy interventions: ADL retraining, body mechanics training, flexibility, functional ROM exercises, home exercise program, therapeutic exercise, therapeutic activities, stretching, strengthening, postural training, patient education, manual therapy and joint mobilization  Frequency: 2x week  Duration in weeks: 4  Plan of Care beginning date: 2024  Plan of Care expiration date: 5/10/2024  Treatment plan discussed with: patient      Subjective Evaluation    History of Present Illness  Mechanism of injury: Patient presents to out patient physical therapy with chief c/o cervical radiculopathy. Patient reports that she had pain that started in her mid back on the L side back in December. She notes that she has since started to develop symptoms into her L arm and L hand. She feels that when she straightens her arm out the tingling will get worse. She also notes that by the end of the day her symptoms will seem worse. She feels that her hand and fingers are stiff.           Not a recurrent problem   Quality of life: good    Patient Goals  Patient goals for therapy: decreased pain, increased strength and return to sport/leisure activities  Patient goal: Patient wishe to be free of radicular symptoms and be able to resume her normal exercise routine without limitation.  Pain  Current pain rating: 3  At best pain rating: 3  At worst pain ratin  Location: Cervical, L periscapular region  Quality: radiating, needle-like, discomfort and dull ache    Hand dominance: right    Treatments  Previous treatment: chiropractic and medication      Objective     Static Posture     Head  Forward.    Shoulders  Rounded.    Thoracic Spine  Hyperkyphosis.    Palpation   Left   Tenderness of the rhomboids.     Neurological Testing     Sensation   Cervical/Thoracic   Left   Diminished: light touch    Right   Intact:  "light touch    Active Range of Motion   Cervical/Thoracic Spine       Cervical    Flexion: 38 degrees   Extension: 42 degrees     with pain  Left lateral flexion: 21 degrees      Right lateral flexion: 18 degrees     with pain  Left rotation: 57 degrees  Right rotation: 43 degrees    with pain    Joint Play     Hypomobile: C4, C5 and C6     Tests   Cervical   Positive vertical compression and cervical distraction test.    Left   Positive Spurling's Test A.     Left Shoulder   Positive ULTT1.     Lumbar   Positive vertical compression .              Precautions: R MANDY June 2023         Date 4/12       FOTO 47 SC       Manuals        Cervical traction manual 5 min       Median nerve glide 5 min       Passive cervical retraction 30\" 5x       STM to L Rhomboids 5 min       Neuro Re-Ed                                                                Ther Ex        Supine cervical retractions 3x15                                                               Ther Activity                        Gait Training                        Modalities                                             "

## 2024-04-19 ENCOUNTER — OFFICE VISIT (OUTPATIENT)
Dept: PHYSICAL THERAPY | Facility: CLINIC | Age: 79
End: 2024-04-19
Payer: MEDICARE

## 2024-04-19 DIAGNOSIS — M54.12 CERVICAL RADICULOPATHY: Primary | ICD-10-CM

## 2024-04-19 PROCEDURE — 97110 THERAPEUTIC EXERCISES: CPT | Performed by: PHYSICAL THERAPIST

## 2024-04-19 PROCEDURE — 97140 MANUAL THERAPY 1/> REGIONS: CPT | Performed by: PHYSICAL THERAPIST

## 2024-04-19 NOTE — PROGRESS NOTES
"Daily Note     Today's date: 2024  Patient name: Renee Curry  : 1945  MRN: [id number]  Referring provider: Ole Gregorio MD  Dx:   Encounter Diagnosis     ICD-10-CM    1. Cervical radiculopathy  M54.12           Start Time: 1000  Stop Time: 1058  Total time in clinic (min): 58 minutes    Subjective: Patient reports improvements with her upper back pain and stiffness and her shoulder pain. She feels that the numbness and stiffness in her L hand continues to be present.       Objective: See treatment diary below      Assessment: Tolerated treatment well. Patient demonstrated fatigue post treatment, exhibited good technique with therapeutic exercises, and would benefit from continued PT Patient continues with tenderness to the L medial border of the scapula. She continues with a positive ULTT 1a which causes increased pain along the anterior forearm and into the radial aspect of the L hand. There was improved tolerance for elbow extension indicating improving neural mobility since her last session.       Plan: Continue per plan of care.  Progress treatment as tolerated.       Precautions: R MANDY 2023         Date       FOTO 47 SC       Manuals        Cervical traction manual 5 min 5 min      Median nerve glide 5 min 5 min UE only  5 min w/ cervical side glide      Passive cervical retraction 30\" 5x 30\" 5x      STM to L Rhomboids 5 min 5 min      Neuro Re-Ed                                                                Ther Ex        Supine cervical retractions 3x15 2x15      UBE for mobility and strengthening  2'/2' L2      CC rows  P2 20x      Rear deltoid  P2 20x                                      Ther Activity                        Gait Training                        Modalities                               "

## 2024-04-26 ENCOUNTER — OFFICE VISIT (OUTPATIENT)
Dept: PHYSICAL THERAPY | Facility: CLINIC | Age: 79
End: 2024-04-26
Payer: MEDICARE

## 2024-04-26 DIAGNOSIS — M54.12 CERVICAL RADICULOPATHY: Primary | ICD-10-CM

## 2024-04-26 PROCEDURE — 97112 NEUROMUSCULAR REEDUCATION: CPT | Performed by: PHYSICAL THERAPIST

## 2024-04-26 PROCEDURE — 97110 THERAPEUTIC EXERCISES: CPT | Performed by: PHYSICAL THERAPIST

## 2024-04-26 PROCEDURE — 97140 MANUAL THERAPY 1/> REGIONS: CPT | Performed by: PHYSICAL THERAPIST

## 2024-04-26 NOTE — PROGRESS NOTES
"Daily Note     Today's date: 2024  Patient name: Renee Curry  : 1945  MRN: [id number]  Referring provider: Ole Gregorio MD  Dx:   Encounter Diagnosis     ICD-10-CM    1. Cervical radiculopathy  M54.12           Start Time: 1000  Stop Time: 1107  Total time in clinic (min): 67 minutes    Subjective: Patient reports improving pain to the L scapular region and into there L UE. He feels that the index finger and middle finger continue to be numb but the stiffness is improved with the middle finger and slightly improved with the index finger.       Objective: See treatment diary below      Assessment: Tolerated treatment well. Patient demonstrated fatigue post treatment, exhibited good technique with therapeutic exercises, and would benefit from continued PT Patient continues with improvements to the L shoulder and L UE during therapy interventions today. She is able to tolerate increased range during median nerve glide with decreased pain noted during this intervention today. Increased strengthening interventions today.       Plan: Continue per plan of care.  Progress treatment as tolerated.       Precautions: R MANDY 2023         Date  IE      FOTO 47 SC       Manuals        Cervical traction manual 5 min 5 min 5 min     Median nerve glide 5 min 5 min UE only  5 min w/ cervical side glide 5 min UE only   5 min w/ cervical side glide     Passive cervical retraction 30\" 5x 30\" 5x 30\" 5x     STM to L Rhomboids 5 min 5 min 5 min     Neuro Re-Ed        No monies   OTB 10\" 10x                                                     Ther Ex        Supine cervical retractions 3x15 2x15 3\" 15x     UBE for mobility and strengthening  2'/2' L2 L2 2.5'/2.5'     CC rows  P2 20x P2 2x20     Rear deltoid  P2 20x P2 2x20     CC triceps ext   P2 2x20                             Ther Activity                        Gait Training                        Modalities                                 "

## 2024-05-01 ENCOUNTER — OFFICE VISIT (OUTPATIENT)
Dept: PHYSICAL THERAPY | Facility: CLINIC | Age: 79
End: 2024-05-01
Payer: MEDICARE

## 2024-05-01 DIAGNOSIS — M54.12 CERVICAL RADICULOPATHY: Primary | ICD-10-CM

## 2024-05-01 PROCEDURE — 97140 MANUAL THERAPY 1/> REGIONS: CPT

## 2024-05-01 PROCEDURE — 97110 THERAPEUTIC EXERCISES: CPT

## 2024-05-01 PROCEDURE — 97112 NEUROMUSCULAR REEDUCATION: CPT

## 2024-05-01 NOTE — PROGRESS NOTES
"Daily Note     Today's date: 2024  Patient name: Renee Curry  : 1945  MRN: [id number]  Referring provider: Ole Gregorio MD  Dx:   Encounter Diagnosis     ICD-10-CM    1. Cervical radiculopathy  M54.12           Start Time: 1100  Stop Time: 1145  Total time in clinic (min): 45 minutes    Subjective: I was hanging clothes and I was getting some tingling and tightness into my left hand I have trouble bending my fingers.       Objective: See treatment diary below      Assessment: Tolerated treatment well. Patient would benefit from continued PT      Plan: Continue per plan of care.      Precautions: R MANDY 2023         Date  IE     FOTO 47 SC       Manuals        Cervical traction manual 5 min 5 min 5 min     Median nerve glide 5 min 5 min UE only  5 min w/ cervical side glide 5 min UE only   5 min w/ cervical side glide 5 min UE only   5 min w/ cervical side glide by PT    Passive cervical retraction 30\" 5x 30\" 5x 30\" 5x 30\" 5 x    STM to L Rhomboids 5 min 5 min 5 min 5 min    Neuro Re-Ed        No monies   OTB 10\" 10x OTB 10 \" 10 x                                                    Ther Ex        Supine cervical retractions 3x15 2x15 3\" 15x 3\" 15 x    UBE for mobility and strengthening  2'/2' L2 L2 2.5'/2.5' L 2   2/2     CC rows  P2 20x P2 2x20 P 2 2 x 20     Rear deltoid  P2 20x P2 2x20 P 2  2 x 10    CC triceps ext   P2 2x20 P 2  2 x 20                             Ther Activity                        Gait Training                        Modalities                                   "

## 2024-05-10 ENCOUNTER — OFFICE VISIT (OUTPATIENT)
Dept: PHYSICAL THERAPY | Facility: CLINIC | Age: 79
End: 2024-05-10
Payer: MEDICARE

## 2024-05-10 DIAGNOSIS — M54.12 CERVICAL RADICULOPATHY: Primary | ICD-10-CM

## 2024-05-10 PROCEDURE — 97140 MANUAL THERAPY 1/> REGIONS: CPT | Performed by: PHYSICAL THERAPIST

## 2024-05-10 PROCEDURE — 97112 NEUROMUSCULAR REEDUCATION: CPT | Performed by: PHYSICAL THERAPIST

## 2024-05-10 PROCEDURE — 97110 THERAPEUTIC EXERCISES: CPT | Performed by: PHYSICAL THERAPIST

## 2024-05-10 NOTE — PROGRESS NOTES
"Daily Note     Today's date: 5/10/2024  Patient name: Renee Curry  : 1945  MRN: [id number]  Referring provider: Ole Gregorio MD  Dx:   Encounter Diagnosis     ICD-10-CM    1. Cervical radiculopathy  M54.12           Start Time: 1015  Stop Time: 1115  Total time in clinic (min): 60 minutes    Subjective: Patient reports improving symptoms into the L UE and the L side mid back. She is reporting decreased stiffness to the middle digit but continued stiffness to the L 2nd digit.       Objective: See treatment diary below      Assessment: Tolerated treatment well. Patient demonstrated fatigue post treatment, exhibited good technique with therapeutic exercises, and would benefit from continued PT Continued to focus on progression of therapy interventions for strengthening of the cervical and upper thoracic spine. She noted no increase in pain and improved tolerance for median nerve gliding intervention today.       Plan: Continue per plan of care.  Progress treatment as tolerated.       Precautions: R MANDY 2023         Date  IE 4/19 4/26 5/1 5/10   FOTO 47 SC       Manuals        Cervical traction manual 5 min 5 min 5 min     Median nerve glide 5 min 5 min UE only  5 min w/ cervical side glide 5 min UE only   5 min w/ cervical side glide 5 min UE only   5 min w/ cervical side glide by PT 5 min UE only   Passive cervical retraction 30\" 5x 30\" 5x 30\" 5x 30\" 5 x    STM to L Rhomboids 5 min 5 min 5 min 5 min 5 min   Neuro Re-Ed        No monies   OTB 10\" 10x OTB 10 \" 10 x BTB 10\" 10x   Body blade     15\" 2x ea.                                            Ther Ex        Supine cervical retractions 3x15 2x15 3\" 15x 3\" 15 x    UBE for mobility and strengthening  2'/2' L2 L2 2.5'/2.5' L 2   2/2  L2.5 2'/2'   CC rows  P2 20x P2 2x20 P 2 2 x 20  P2 2x15   Rear deltoid  P2 20x P2 2x20 P 2  2 x 10 20# 2x15   CC triceps ext   P2 2x20 P 2  2 x 20  P2 2x15   CC ABDIRIZAK     P2 2x15                   Ther Activity           "              Gait Training                        Modalities

## 2024-05-17 ENCOUNTER — EVALUATION (OUTPATIENT)
Dept: PHYSICAL THERAPY | Facility: CLINIC | Age: 79
End: 2024-05-17
Payer: MEDICARE

## 2024-05-17 DIAGNOSIS — M54.12 CERVICAL RADICULOPATHY: Primary | ICD-10-CM

## 2024-05-17 PROCEDURE — 97140 MANUAL THERAPY 1/> REGIONS: CPT | Performed by: PHYSICAL THERAPIST

## 2024-05-17 PROCEDURE — 97110 THERAPEUTIC EXERCISES: CPT | Performed by: PHYSICAL THERAPIST

## 2024-05-17 PROCEDURE — 97112 NEUROMUSCULAR REEDUCATION: CPT | Performed by: PHYSICAL THERAPIST

## 2024-05-17 NOTE — LETTER
May 17, 2024    Ole Gregorio MD  [address] [address]    Patient: Renee Curry   YOB: 1945   Date of Visit: 2024     Encounter Diagnosis     ICD-10-CM    1. Cervical radiculopathy  M54.12           Dear Dr. Gregorio:    Thank you for your recent referral of Renee Curry. Please review the attached evaluation summary from Renee's recent visit.     Please verify that you agree with the plan of care by signing the attached order.     If you have any questions or concerns, please do not hesitate to call.     I sincerely appreciate the opportunity to share in the care of one of your patients and hope to have another opportunity to work with you in the near future.       Sincerely,    Malik Edgar, PT      Referring Provider:      I certify that I have read the below Plan of Care and certify the need for these services furnished under this plan of treatment while under my care.                    Ole Gregorio MD  [address] [address]  Via Fax: [phone]          PT Re-Evaluation     Today's date: 2024  Patient name: Renee Curry  : 1945  MRN: [id number]  Referring provider: Ole Gregorio MD  Dx:   Encounter Diagnosis     ICD-10-CM    1. Cervical radiculopathy  M54.12           Start Time: 1015  Stop Time: 1112  Total time in clinic (min): 57 minutes    Assessment  Impairments: abnormal or restricted ROM, activity intolerance, impaired physical strength, lacks appropriate home exercise program, pain with function and poor posture   Symptom irritability: moderate    Assessment details:  Patient has attended 6 physical therapy visits to date. She is remarking on decreased pain during cervical arom along with during palpation of the medial aspect of the L scapula. She noted no pain during her evaluation today with the exception of end range of median nerve tension test. There was good tolerance for progression of therapy interventions which are focused on  improving strength to allow for improved tolerance with adls and gardening at home.   Understanding of Dx/Px/POC: good     Prognosis: good    Goals  STGs:  Patient will be independent with hep by 2-3 visits. - MET  Decrease pain levels by 25% for improved tolerance with adls by 2-4 weeks. - MET  Improve cervical rom to wnl for improved tolerance with adls by 2-4 weeks. - MET  Improve B/L UE rom to wnl for improved tolerance with adls by 2-4 weeks. - MET    LTGs:  Improve FOTO score from 47 to 60 indicating improved tolerance with activities involving the cervical spine and B/L UEs by discharge. - MET  Patient will be able to return to normal work and recreation without limitation from the cervical spine by discharge. - progressing  Patient will be able to perform all adls with pain levels not exceeding 2/10 by discharge. - Progressing  Patient will be able to return to normal home duties without limitation from the cervical spine by discharge. - Progressing            Plan  Patient would benefit from: skilled physical therapy  Planned modality interventions: traction, electrical stimulation/Russian stimulation and thermotherapy: hydrocollator packs    Planned therapy interventions: ADL retraining, body mechanics training, flexibility, functional ROM exercises, home exercise program, therapeutic exercise, therapeutic activities, stretching, strengthening, postural training, patient education, manual therapy and joint mobilization    Frequency: 1x week  Duration in weeks: 3  Plan of Care beginning date: 5/17/2024  Plan of Care expiration date: 6/7/2024  Treatment plan discussed with: patient  Plan details: Patient informed that from this point forward, to ensure adherence to the aforementioned plan of care, all or some of the treatment may be performed and carried out by a Physical Therapy Assistant (PTA) with supervision from a licensed Physical Therapist (PT) in accordance with [facility] [place]  Practice Act.  Patient will continue to benefit from skilled physical therapy to address the functional deficits that were identified during the evaluation today. We will continue to progress the therapy program to address these functional deficits and achieve the established goals.               Subjective Evaluation    History of Present Illness  Mechanism of injury: Patient presents to out patient physical therapy with chief c/o cervical radiculopathy. Patient reports that she had pain that started in her mid back on the L side back in December. She notes that she has since started to develop symptoms into her L arm and L hand. She feels that when she straightens her arm out the tingling will get worse. She also notes that by the end of the day her symptoms will seem worse. She feels that her hand and fingers are stiff.     Update 2024:  Patient reports improvements with upper back pain. She notes that she is able to do work around her house and she has mild increase in pain by that evening but that she is no longer having the intense pain that she was experiencing.           Not a recurrent problem   Quality of life: good    Patient Goals  Patient goals for therapy: decreased pain, increased strength and return to sport/leisure activities  Patient goal: Patient wishe to be free of radicular symptoms and be able to resume her normal exercise routine without limitation.  Pain  Current pain ratin  At best pain ratin  At worst pain rating: 3  Location: Cervical, L periscapular region  Quality: radiating, needle-like, discomfort and dull ache    Hand dominance: right    Treatments  Previous treatment: chiropractic and medication      Objective     Static Posture     Head  Forward.    Shoulders  Rounded.    Thoracic Spine  Hyperkyphosis.    Palpation   Left   Tenderness of the rhomboids.     Neurological Testing     Sensation   Cervical/Thoracic   Left   Diminished: light touch    Right   Intact: light  "touch    Active Range of Motion   Cervical/Thoracic Spine       Cervical    Flexion: 43 degrees   Extension: 36 degrees      Left lateral flexion: 36 degrees      Right lateral flexion: 36 degrees      Left rotation: 61 degrees  Right rotation: 63 degrees       Joint Play     Hypomobile: C4, C5 and C6     Tests   Cervical   Positive vertical compression and cervical distraction test.    Left   Positive Spurling's Test A.     Left Shoulder   Negative ULTT1.     Lumbar   Positive vertical compression .              Precautions: R MANDY June 2023         Date 5/17 Reassess 4/19 4/26 5/1 5/10   FOTO     66 SC   Manuals        Cervical traction manual  5 min 5 min     Median nerve glide 5 min UE only 5 min UE only  5 min w/ cervical side glide 5 min UE only   5 min w/ cervical side glide 5 min UE only   5 min w/ cervical side glide by PT 5 min UE only   Passive cervical retraction  30\" 5x 30\" 5x 30\" 5 x    STM to L Rhomboids 5 min 5 min 5 min 5 min 5 min   Neuro Re-Ed        No monies 10\" 10x  OTB 10\" 10x OTB 10 \" 10 x BTB 10\" 10x   Body blade 20\" 4x    15\" 2x ea.                                            Ther Ex        Supine cervical retractions  2x15 3\" 15x 3\" 15 x    UBE for mobility and strengthening L2.5 3'/3' 2'/2' L2 L2 2.5'/2.5' L 2   2/2  L2.5 2'/2'   CC rows P3 2x15 P2 20x P2 2x20 P 2 2 x 20  P2 2x15   Rear deltoid 25# 2x15 P2 20x P2 2x20 P 2  2 x 10 20# 2x15   CC triceps ext P2 2x15  P2 2x20 P 2  2 x 20  P2 2x15   CC ABDIRIZAK P3 2x15    P2 2x15                   Ther Activity                        Gait Training                        Modalities                                               "

## 2024-05-17 NOTE — PROGRESS NOTES
PT Re-Evaluation     Today's date: 2024  Patient name: Renee Curry  : 1945  MRN: [id number]  Referring provider: Ole Gregorio MD  Dx:   Encounter Diagnosis     ICD-10-CM    1. Cervical radiculopathy  M54.12           Start Time: 1015  Stop Time: 1112  Total time in clinic (min): 57 minutes    Assessment  Impairments: abnormal or restricted ROM, activity intolerance, impaired physical strength, lacks appropriate home exercise program, pain with function and poor posture   Symptom irritability: moderate    Assessment details:  Patient has attended 6 physical therapy visits to date. She is remarking on decreased pain during cervical arom along with during palpation of the medial aspect of the L scapula. She noted no pain during her evaluation today with the exception of end range of median nerve tension test. There was good tolerance for progression of therapy interventions which are focused on improving strength to allow for improved tolerance with adls and gardening at home.   Understanding of Dx/Px/POC: good     Prognosis: good    Goals  STGs:  Patient will be independent with hep by 2-3 visits. - MET  Decrease pain levels by 25% for improved tolerance with adls by 2-4 weeks. - MET  Improve cervical rom to wnl for improved tolerance with adls by 2-4 weeks. - MET  Improve B/L UE rom to wnl for improved tolerance with adls by 2-4 weeks. - MET    LTGs:  Improve FOTO score from 47 to 60 indicating improved tolerance with activities involving the cervical spine and B/L UEs by discharge. - MET  Patient will be able to return to normal work and recreation without limitation from the cervical spine by discharge. - progressing  Patient will be able to perform all adls with pain levels not exceeding 2/10 by discharge. - Progressing  Patient will be able to return to normal home duties without limitation from the cervical spine by discharge. - Progressing            Plan  Patient would benefit from: skilled  physical therapy  Planned modality interventions: traction, electrical stimulation/Russian stimulation and thermotherapy: hydrocollator packs    Planned therapy interventions: ADL retraining, body mechanics training, flexibility, functional ROM exercises, home exercise program, therapeutic exercise, therapeutic activities, stretching, strengthening, postural training, patient education, manual therapy and joint mobilization    Frequency: 1x week  Duration in weeks: 3  Plan of Care beginning date: 5/17/2024  Plan of Care expiration date: 6/7/2024  Treatment plan discussed with: patient  Plan details: Patient informed that from this point forward, to ensure adherence to the aforementioned plan of care, all or some of the treatment may be performed and carried out by a Physical Therapy Assistant (PTA) with supervision from a licensed Physical Therapist (PT) in accordance with [facility] Physical Therapy Practice Act.  Patient will continue to benefit from skilled physical therapy to address the functional deficits that were identified during the evaluation today. We will continue to progress the therapy program to address these functional deficits and achieve the established goals.               Subjective Evaluation    History of Present Illness  Mechanism of injury: Patient presents to out patient physical therapy with chief c/o cervical radiculopathy. Patient reports that she had pain that started in her mid back on the L side back in December. She notes that she has since started to develop symptoms into her L arm and L hand. She feels that when she straightens her arm out the tingling will get worse. She also notes that by the end of the day her symptoms will seem worse. She feels that her hand and fingers are stiff.     Update 5/17/2024:  Patient reports improvements with upper back pain. She notes that she is able to do work around her house and she has mild increase in pain by that evening but that she  "is no longer having the intense pain that she was experiencing.           Not a recurrent problem   Quality of life: good    Patient Goals  Patient goals for therapy: decreased pain, increased strength and return to sport/leisure activities  Patient goal: Patient wishe to be free of radicular symptoms and be able to resume her normal exercise routine without limitation.  Pain  Current pain ratin  At best pain ratin  At worst pain rating: 3  Location: Cervical, L periscapular region  Quality: radiating, needle-like, discomfort and dull ache    Hand dominance: right    Treatments  Previous treatment: chiropractic and medication      Objective     Static Posture     Head  Forward.    Shoulders  Rounded.    Thoracic Spine  Hyperkyphosis.    Palpation   Left   Tenderness of the rhomboids.     Neurological Testing     Sensation   Cervical/Thoracic   Left   Diminished: light touch    Right   Intact: light touch    Active Range of Motion   Cervical/Thoracic Spine       Cervical    Flexion: 43 degrees   Extension: 36 degrees      Left lateral flexion: 36 degrees      Right lateral flexion: 36 degrees      Left rotation: 61 degrees  Right rotation: 63 degrees       Joint Play     Hypomobile: C4, C5 and C6     Tests   Cervical   Positive vertical compression and cervical distraction test.    Left   Positive Spurling's Test A.     Left Shoulder   Negative ULTT1.     Lumbar   Positive vertical compression .              Precautions: R MANDY 2023         Date  Reassess 4/19 4/26 5/1 5/10   FOTO     66 SC   Manuals        Cervical traction manual  5 min 5 min     Median nerve glide 5 min UE only 5 min UE only  5 min w/ cervical side glide 5 min UE only   5 min w/ cervical side glide 5 min UE only   5 min w/ cervical side glide by PT 5 min UE only   Passive cervical retraction  30\" 5x 30\" 5x 30\" 5 x    STM to L Rhomboids 5 min 5 min 5 min 5 min 5 min   Neuro Re-Ed        No monies 10\" 10x  OTB 10\" 10x OTB 10 \" 10 x " "BTB 10\" 10x   Body blade 20\" 4x    15\" 2x ea.                                            Ther Ex        Supine cervical retractions  2x15 3\" 15x 3\" 15 x    UBE for mobility and strengthening L2.5 3'/3' 2'/2' L2 L2 2.5'/2.5' L 2   2/2  L2.5 2'/2'   CC rows P3 2x15 P2 20x P2 2x20 P 2 2 x 20  P2 2x15   Rear deltoid 25# 2x15 P2 20x P2 2x20 P 2  2 x 10 20# 2x15   CC triceps ext P2 2x15  P2 2x20 P 2  2 x 20  P2 2x15   CC ABDIRIZAK P3 2x15    P2 2x15                   Ther Activity                        Gait Training                        Modalities                               "

## 2024-05-22 ENCOUNTER — OFFICE VISIT (OUTPATIENT)
Dept: PHYSICAL THERAPY | Facility: CLINIC | Age: 79
End: 2024-05-22
Payer: MEDICARE

## 2024-05-22 DIAGNOSIS — M54.12 CERVICAL RADICULOPATHY: Primary | ICD-10-CM

## 2024-05-22 PROCEDURE — 97112 NEUROMUSCULAR REEDUCATION: CPT | Performed by: PHYSICAL THERAPIST

## 2024-05-22 PROCEDURE — 97110 THERAPEUTIC EXERCISES: CPT | Performed by: PHYSICAL THERAPIST

## 2024-05-22 PROCEDURE — 97140 MANUAL THERAPY 1/> REGIONS: CPT | Performed by: PHYSICAL THERAPIST

## 2024-05-22 NOTE — PROGRESS NOTES
"Daily Note     Today's date: 2024  Patient name: Renee Curry  : 1945  MRN: [id number]  Referring provider: Ole Gregorio MD  Dx:   Encounter Diagnosis     ICD-10-CM    1. Cervical radiculopathy  M54.12           Start Time: 1015  Stop Time: 1115  Total time in clinic (min): 60 minutes    Subjective: Patient reports that her 2nd and 3rd digits are still stiff and she gets occasional shooting pain into the radial aspect of the L hand at times. She feels that when she is doing a lot of yard work and gardening that this will cause her symptoms to increase.       Objective: See treatment diary below      Assessment: Tolerated treatment well. Patient demonstrated fatigue post treatment, exhibited good technique with therapeutic exercises, and would benefit from continued PT Patient responded well to median nerve glides today with improved mobility and decrease symptoms noted post. She felt improved mobility of the 2nd and 3rd digits of the L hand post therapy interventions today as well. Continued to focus on progression of strengthening and neural mobility with therapy interventions today.       Plan: Continue per plan of care.  Progress treatment as tolerated.       Precautions: R MANDY 2023         Date  Reassess 5/22 4/26 5/1 5/10   FOTO     66 SC   Manuals        Cervical traction manual   5 min     Median nerve glide 5 min UE only 5 min 5 min UE only   5 min w/ cervical side glide 5 min UE only   5 min w/ cervical side glide by PT 5 min UE only   Passive cervical retraction   30\" 5x 30\" 5 x    STM to L Rhomboids 5 min 5 min 5 min 5 min 5 min   Neuro Re-Ed        No monies 10\" 10x 10\" 10x BTB OTB 10\" 10x OTB 10 \" 10 x BTB 10\" 10x   Body blade 20\" 4x    15\" 2x ea.    Supine DNF isometri  5\" 15x                                      Ther Ex        Supine cervical retractions  5\" 20x 3\" 15x 3\" 15 x    UBE for mobility and strengthening L2.5 3'/3' L2.5 2'/2' L2 2.5'/2.5' L 2   2/2  L2.5 2'/2'   CC " rows P3 2x15 P3 2x15 P2 2x20 P 2 2 x 20  P2 2x15   Rear deltoid 25# 2x15 25# 2x15 P2 2x20 P 2  2 x 10 20# 2x15   CC triceps ext P2 2x15 P3 2x15 P2 2x20 P 2  2 x 20  P2 2x15   CC ABDIRIZAK P3 2x15 P3 2x15   P2 2x15                   Ther Activity                        Gait Training                        Modalities

## 2024-05-31 ENCOUNTER — OFFICE VISIT (OUTPATIENT)
Dept: PHYSICAL THERAPY | Facility: CLINIC | Age: 79
End: 2024-05-31
Payer: MEDICARE

## 2024-05-31 DIAGNOSIS — M54.12 CERVICAL RADICULOPATHY: Primary | ICD-10-CM

## 2024-05-31 PROCEDURE — 97112 NEUROMUSCULAR REEDUCATION: CPT | Performed by: PHYSICAL THERAPIST

## 2024-05-31 PROCEDURE — 97110 THERAPEUTIC EXERCISES: CPT | Performed by: PHYSICAL THERAPIST

## 2024-05-31 PROCEDURE — 97140 MANUAL THERAPY 1/> REGIONS: CPT | Performed by: PHYSICAL THERAPIST

## 2024-05-31 NOTE — PROGRESS NOTES
"Daily Note     Today's date: 2024  Patient name: Renee Curry  : 1945  MRN: [id number]  Referring provider: Ole Gregorio MD  Dx:   Encounter Diagnosis     ICD-10-CM    1. Cervical radiculopathy  M54.12           Start Time: 1015  Stop Time: 1112  Total time in clinic (min): 57 minutes    Subjective: Patient reports that she is doing better compared to last week. She felt that the therapy interventions performed last week helped to decrease her pain and improve her tolerance for daily activities and housework.       Objective: See treatment diary below      Assessment: Tolerated treatment well. Patient demonstrated fatigue post treatment, exhibited good technique with therapeutic exercises, and would benefit from continued PT Patient continues to show good improvements with neural mobility of the L UE during median nerve glides and ULTT assessment. She was challenged with progression of strengthening interventions today to the L UE with good tolerance and pain levels were kept well controlled throughout the session.       Plan: Continue per plan of care.  Progress note during next visit.  Progress treatment as tolerated.       Precautions: R MANDY 2023         Date  Reassess 5/22 5/31 5/1 5/10   FOTO     66 SC   Manuals        Cervical traction manual        Median nerve glide 5 min UE only 5 min 5 min 5 min UE only   5 min w/ cervical side glide by PT 5 min UE only   Passive cervical retraction    30\" 5 x    STM to L Rhomboids 5 min 5 min  5 min 5 min   Neuro Re-Ed        No monies 10\" 10x 10\" 10x BTB 15\" 10x OTB 10 \" 10 x BTB 10\" 10x   Body blade 20\" 4x    15\" 2x ea.    Supine DNF isometric  5\" 15x 10\" 10x                                     Ther Ex        Supine cervical retractions  5\" 20x  3\" 15 x    UBE for mobility and strengthening L2.5 3'/3' L2.5 2'/2' L2 3'/3' L 2   2/2  L2.5 2'/2'   CC rows P3 2x15 P3 2x15 P3 30x P 2 2 x 20  P2 2x15   Rear deltoid 25# 2x15 25# 2x15 20# 2x15 P 2  2 x " 10 20# 2x15   CC triceps ext P2 2x15 P3 2x15 P3 30x P 2  2 x 20  P2 2x15   CC ABDIRIZAK P3 2x15 P3 2x15 P3 30x  P2 2x15   Lat pulldown   35# 20x             Ther Activity        Farmer's carry   10# 4 laps             Gait Training                        Modalities

## 2024-06-05 ENCOUNTER — OFFICE VISIT (OUTPATIENT)
Dept: PHYSICAL THERAPY | Facility: CLINIC | Age: 79
End: 2024-06-05
Payer: MEDICARE

## 2024-06-05 DIAGNOSIS — M54.12 CERVICAL RADICULOPATHY: Primary | ICD-10-CM

## 2024-06-05 PROCEDURE — 97112 NEUROMUSCULAR REEDUCATION: CPT | Performed by: PHYSICAL THERAPIST

## 2024-06-05 PROCEDURE — 97110 THERAPEUTIC EXERCISES: CPT | Performed by: PHYSICAL THERAPIST

## 2024-06-05 PROCEDURE — 97140 MANUAL THERAPY 1/> REGIONS: CPT | Performed by: PHYSICAL THERAPIST

## 2024-06-05 PROCEDURE — 97530 THERAPEUTIC ACTIVITIES: CPT | Performed by: PHYSICAL THERAPIST

## 2024-06-05 NOTE — PROGRESS NOTES
"Daily Note     Today's date: 2024  Patient name: Renee Curry  : 1945  MRN: [id number]  Referring provider: Ole Gregorio MD  Dx:   Encounter Diagnosis     ICD-10-CM    1. Cervical radiculopathy  M54.12           Start Time: 1015  Stop Time: 1115  Total time in clinic (min): 60 minutes    Subjective: Patient reports continued slow improvements with her L hand and L mid back. She notes that she feels minimal pain to her mid back since starting therapy. She notes that her middle finger feels as though it is loosening up but the index finger is still stiff.       Objective: See treatment diary below      Assessment: Tolerated treatment well. Patient demonstrated fatigue post treatment, exhibited good technique with therapeutic exercises, and would benefit from continued PT Patient continues to demonstrate mild upper limb tension for both L median nerve A and radial nerve. Patient responded well to therapy interventions which continues to focus on improving functional strengthening of the L UE and cervical spine and improving neural mobility of the L UE.       Plan: Continue per plan of care.  Progress note during next visit.      Precautions: R MANDY 2023         Date  Reassess 5/22 5/31 6/5  5/10   FOTO     66 SC   Manuals        Cervical traction manual        Median & Radial nerve glide 5 min UE only 5 min 5 min 15 min 5 min UE only   Passive cervical retraction        STM to L Rhomboids 5 min 5 min   5 min   Neuro Re-Ed        No monies 10\" 10x 10\" 10x BTB 15\" 10x  BTB 10\" 10x   Body blade 20\" 4x    15\" 2x ea.    Supine DNF isometric  5\" 15x 10\" 10x 10\" 10x                                    Ther Ex        Supine cervical retractions  5\" 20x      UBE for mobility and strengthening L2.5 3'/3' L2.5 2'/2' L2 3'/3' L2 3'/3' L2.5 2'/2'   CC rows P3 2x15 P3 2x15 P3 30x P3 2x20 P2 2x15   Rear deltoid 25# 2x15 25# 2x15 20# 2x15 20# 2x20 20# 2x15   CC triceps ext P2 2x15 P3 2x15 P3 30x P3 2x20 P2 2x15 "   CC ABDIRIZAK P3 2x15 P3 2x15 P3 30x P3 2x20 P2 2x15   Lat pulldown   35# 20x 40# 20x            Ther Activity        Farmer's carry   10# 4 laps 10# 4 laps            Gait Training                        Modalities

## 2024-06-13 ENCOUNTER — EVALUATION (OUTPATIENT)
Dept: PHYSICAL THERAPY | Facility: CLINIC | Age: 79
End: 2024-06-13
Payer: MEDICARE

## 2024-06-13 DIAGNOSIS — M54.12 CERVICAL RADICULOPATHY: Primary | ICD-10-CM

## 2024-06-13 PROCEDURE — 97110 THERAPEUTIC EXERCISES: CPT | Performed by: PHYSICAL THERAPIST

## 2024-06-13 PROCEDURE — 97530 THERAPEUTIC ACTIVITIES: CPT | Performed by: PHYSICAL THERAPIST

## 2024-06-13 NOTE — PROGRESS NOTES
PT Re-Evaluation     Today's date: 2024  Patient name: Renee Curry  : 1945  MRN: [id number]  Referring provider: Ole Gregorio MD  Dx:   Encounter Diagnosis     ICD-10-CM    1. Cervical radiculopathy  M54.12           Start Time: 1015  Stop Time: 1100  Total time in clinic (min): 45 minutes    Assessment  Impairments: abnormal or restricted ROM, impaired physical strength, pain with function and poor posture   Symptom irritability: low    Assessment details: Patient has attended 10 physical therapy visits to date. She is demonstrating overall improvement with her mobility and strength at this time. She is reporting minimal limitation with her therapy interventions and throughout her functional activities at home. She is noting minimal pain throughout her assessment today. We did discuss her symptoms into her L hand that seem to be isolated to the 2nd and 3rd digits and recommended that if this continues or worsens that she should have her hand evaluated by a hand and wrist specialist. Patient was provided with a copy of her HEP for continued performance at home.   Understanding of Dx/Px/POC: good     Prognosis: good    Goals  STGs:  Patient will be independent with hep by 2-3 visits. - MET  Decrease pain levels by 25% for improved tolerance with adls by 2-4 weeks. - MET  Improve cervical rom to wnl for improved tolerance with adls by 2-4 weeks. - MET  Improve B/L UE rom to wnl for improved tolerance with adls by 2-4 weeks. - MET    LTGs:  Improve FOTO score from 47 to 60 indicating improved tolerance with activities involving the cervical spine and B/L UEs by discharge. - MET  Patient will be able to return to normal work and recreation without limitation from the cervical spine by discharge. - MET  Patient will be able to perform all adls with pain levels not exceeding 2/10 by discharge. - MET  Patient will be able to return to normal home duties without limitation from the cervical spine by  discharge. - MET            Plan    Treatment plan discussed with: patient  Plan details: Patient to continue with a self guided HEP at this time. She is to contact the office with any future needs or concerns.               Subjective Evaluation    History of Present Illness  Mechanism of injury: Patient presents to out patient physical therapy with chief c/o cervical radiculopathy. Patient reports that she had pain that started in her mid back on the L side back in December. She notes that she has since started to develop symptoms into her L arm and L hand. She feels that when she straightens her arm out the tingling will get worse. She also notes that by the end of the day her symptoms will seem worse. She feels that her hand and fingers are stiff.     Update 2024:  Patient reports improvements with upper back pain. She notes that she is able to do work around her house and she has mild increase in pain by that evening but that she is no longer having the intense pain that she was experiencing.     Update 2024:  Patient reports near resolution of symptoms into the L upper back and neck. She feels that her main complaint today is that she fell last Friday and impacted her chest on her fence gait. She notes that she did not have an increase to upper back or neck pain since the incident and denies any other issues other than some scrapes and bruises. She feels that she is able to do most of her yard work and daily activities without pain.           Not a recurrent problem   Quality of life: good    Patient Goals  Patient goals for therapy: decreased pain, increased strength and return to sport/leisure activities  Patient goal: Patient wishe to be free of radicular symptoms and be able to resume her normal exercise routine without limitation.  Pain  Current pain ratin  At best pain ratin  At worst pain ratin  Location: Cervical, L periscapular region  Quality: discomfort and tight    Hand  "dominance: right    Treatments  Previous treatment: chiropractic and medication      Objective     Static Posture     Head  Forward.    Shoulders  Rounded.    Thoracic Spine  Hyperkyphosis.    Palpation   Left   Tenderness of the rhomboids.     Neurological Testing     Sensation   Cervical/Thoracic   Left   Diminished: light touch    Right   Intact: light touch    Active Range of Motion   Cervical/Thoracic Spine       Cervical    Flexion:  WFL  Extension:  WFL  Left lateral flexion:  WFL  Right lateral flexion:  WFL  Left rotation:  WFL  Right rotation:  WFL    Joint Play     Hypomobile: C4, C5 and C6     Tests   Cervical   Negative vertical compression and cervical distraction.     Left   Negative Spurling's Test A.     Left Shoulder   Negative ULTT1.     Lumbar   Negative vertical compression.              Precautions: R MANDY June 2023         Date 5/17 Reassess 5/22 5/31 6/5 6/13 Reassess/DC   FOTO     83 SC   Manuals        Cervical traction manual        Median & Radial nerve glide 5 min UE only 5 min 5 min 15 min    Passive cervical retraction        STM to L Rhomboids 5 min 5 min      Neuro Re-Ed        No monies 10\" 10x 10\" 10x BTB 15\" 10x     Body blade 20\" 4x       Supine DNF isometric  5\" 15x 10\" 10x 10\" 10x                                    Ther Ex        Supine cervical retractions  5\" 20x      UBE for mobility and strengthening L2.5 3'/3' L2.5 2'/2' L2 3'/3' L2 3'/3' L1 2'/2'   CC rows P3 2x15 P3 2x15 P3 30x P3 2x20 P2 2x15   Rear deltoid 25# 2x15 25# 2x15 20# 2x15 20# 2x20 20# 2x15   CC triceps ext P2 2x15 P3 2x15 P3 30x P3 2x20 P2 2x15   CC ABDIRIZAK P3 2x15 P3 2x15 P3 30x P3 2x20 P2 2x15   Lat pulldown   35# 20x 40# 20x 30# 20x           Ther Activity        Farmer's carry   10# 4 laps 10# 4 laps 10# 4 laps           Gait Training                        Modalities                               "

## 2024-08-28 ENCOUNTER — APPOINTMENT (RX ONLY)
Dept: URBAN - NONMETROPOLITAN AREA CLINIC 4 | Facility: CLINIC | Age: 79
Setting detail: DERMATOLOGY
End: 2024-08-28

## 2024-08-28 DIAGNOSIS — L259 CONTACT DERMATITIS AND OTHER ECZEMA, UNSPECIFIED CAUSE: ICD-10-CM | Status: INADEQUATELY CONTROLLED

## 2024-08-28 DIAGNOSIS — L21.8 OTHER SEBORRHEIC DERMATITIS: ICD-10-CM | Status: INADEQUATELY CONTROLLED

## 2024-08-28 DIAGNOSIS — B35.3 TINEA PEDIS: ICD-10-CM

## 2024-08-28 PROBLEM — L30.8 OTHER SPECIFIED DERMATITIS: Status: ACTIVE | Noted: 2024-08-28

## 2024-08-28 PROCEDURE — ? COUNSELING

## 2024-08-28 PROCEDURE — ? MDM - TREATMENT GOALS

## 2024-08-28 PROCEDURE — ? PRESCRIPTION

## 2024-08-28 PROCEDURE — 99214 OFFICE O/P EST MOD 30 MIN: CPT

## 2024-08-28 PROCEDURE — ? PRESCRIPTION MEDICATION MANAGEMENT

## 2024-08-28 RX ORDER — KETOCONAZOLE 20 MG/G
2% CREAM TOPICAL BID
Qty: 60 | Refills: 3 | Status: ERX | COMMUNITY
Start: 2024-08-28

## 2024-08-28 RX ADMIN — KETOCONAZOLE 2%: 20 CREAM TOPICAL at 00:00

## 2024-08-28 ASSESSMENT — LOCATION ZONE DERM
LOCATION ZONE: EAR
LOCATION ZONE: ARM
LOCATION ZONE: FEET
LOCATION ZONE: FACE
LOCATION ZONE: SCALP

## 2024-08-28 ASSESSMENT — LOCATION SIMPLE DESCRIPTION DERM
LOCATION SIMPLE: LEFT EYEBROW
LOCATION SIMPLE: LEFT FOREARM
LOCATION SIMPLE: LEFT EAR
LOCATION SIMPLE: LEFT FOOT
LOCATION SIMPLE: RIGHT FOOT
LOCATION SIMPLE: RIGHT FOREARM
LOCATION SIMPLE: POSTERIOR SCALP
LOCATION SIMPLE: RIGHT EYEBROW

## 2024-08-28 ASSESSMENT — LOCATION DETAILED DESCRIPTION DERM
LOCATION DETAILED: LEFT POSTERIOR EAR
LOCATION DETAILED: RIGHT POSTAURICULAR SKIN
LOCATION DETAILED: RIGHT MEDIAL DORSAL FOOT
LOCATION DETAILED: LEFT MEDIAL DORSAL FOOT
LOCATION DETAILED: RIGHT CENTRAL EYEBROW
LOCATION DETAILED: LEFT PROXIMAL DORSAL FOREARM
LOCATION DETAILED: RIGHT INFERIOR OCCIPITAL SCALP
LOCATION DETAILED: LEFT CENTRAL EYEBROW
LOCATION DETAILED: RIGHT DISTAL ULNAR DORSAL FOREARM

## 2024-08-28 ASSESSMENT — BSA RASH: BSA RASH: 3

## 2024-08-28 ASSESSMENT — SEVERITY ASSESSMENT
HOW SEVERE IS THIS PATIENT'S CONDITION?: MILD
SEVERITY: MILD TO MODERATE

## 2024-08-28 ASSESSMENT — SEVERITY ASSESSMENT 2020: SEVERITY 2020: MILD

## 2024-08-28 ASSESSMENT — ITCH NUMERIC RATING SCALE: HOW SEVERE IS YOUR ITCHING?: 4

## 2024-08-28 NOTE — PROCEDURE: PRESCRIPTION MEDICATION MANAGEMENT
Initiate Treatment: Ketoconazole 2% cream BID
Detail Level: Zone
Render In Strict Bullet Format?: No
Continue Regimen: Clobetasol 0.05% cream BID

## 2024-08-28 NOTE — PROCEDURE: MDM - TREATMENT GOALS
Received request via: Pharmacy    Was the patient seen in the last year in this department? Yes    Does the patient have an active prescription (recently filled or refills available) for medication(s) requested? No     Last OV 6/28/21.   Treatment Goal Explanation (Does Not Render In The Note): Stable for the purposes of categorizing medical decision making is defined by the specific treatment goals for an individual patient. A patient that is not at their treatment goal is not stable, even if the condition has not changed and there is no short- term threat to life or function.

## 2024-09-25 ENCOUNTER — APPOINTMENT (RX ONLY)
Dept: URBAN - NONMETROPOLITAN AREA CLINIC 4 | Facility: CLINIC | Age: 79
Setting detail: DERMATOLOGY
End: 2024-09-25

## 2024-09-25 DIAGNOSIS — L21.8 OTHER SEBORRHEIC DERMATITIS: ICD-10-CM | Status: IMPROVED

## 2024-09-25 DIAGNOSIS — B35.3 TINEA PEDIS: ICD-10-CM

## 2024-09-25 PROCEDURE — ? COUNSELING

## 2024-09-25 PROCEDURE — ? PHOTO-DOCUMENTATION

## 2024-09-25 PROCEDURE — ? PRESCRIPTION MEDICATION MANAGEMENT

## 2024-09-25 PROCEDURE — 99213 OFFICE O/P EST LOW 20 MIN: CPT

## 2024-09-25 ASSESSMENT — LOCATION SIMPLE DESCRIPTION DERM
LOCATION SIMPLE: RIGHT SUPERIOR EYELID
LOCATION SIMPLE: RIGHT EYEBROW
LOCATION SIMPLE: LEFT FOOT
LOCATION SIMPLE: LEFT FOOT
LOCATION SIMPLE: LEFT EYEBROW
LOCATION SIMPLE: RIGHT FOOT
LOCATION SIMPLE: RIGHT FOOT

## 2024-09-25 ASSESSMENT — LOCATION ZONE DERM
LOCATION ZONE: EYELID
LOCATION ZONE: FEET
LOCATION ZONE: FACE
LOCATION ZONE: FEET

## 2024-09-25 ASSESSMENT — LOCATION DETAILED DESCRIPTION DERM
LOCATION DETAILED: RIGHT CENTRAL EYEBROW
LOCATION DETAILED: RIGHT MEDIAL DORSAL FOOT
LOCATION DETAILED: RIGHT LATERAL SUPERIOR EYELID
LOCATION DETAILED: RIGHT MEDIAL DORSAL FOOT
LOCATION DETAILED: LEFT DORSAL FOOT
LOCATION DETAILED: LEFT CENTRAL EYEBROW
LOCATION DETAILED: LEFT DORSAL FOOT

## 2024-09-25 ASSESSMENT — SEVERITY ASSESSMENT: SEVERITY: MILD

## 2024-09-25 NOTE — PROCEDURE: PRESCRIPTION MEDICATION MANAGEMENT
Detail Level: Zone
Continue Regimen: Ketoconazole 2% cream BID
Render In Strict Bullet Format?: No
Continue Regimen: Ketoconazole 2% cream BID PRN flares

## 2025-06-16 ENCOUNTER — APPOINTMENT (OUTPATIENT)
Dept: URBAN - NONMETROPOLITAN AREA CLINIC 4 | Facility: CLINIC | Age: 80
Setting detail: DERMATOLOGY
End: 2025-06-16

## 2025-06-16 DIAGNOSIS — L82.0 INFLAMED SEBORRHEIC KERATOSIS: ICD-10-CM | Status: INADEQUATELY CONTROLLED

## 2025-06-16 PROCEDURE — ? COUNSELING

## 2025-06-16 PROCEDURE — ? LIQUID NITROGEN

## 2025-06-16 ASSESSMENT — PAIN INTENSITY VAS: HOW INTENSE IS YOUR PAIN 0 BEING NO PAIN, 10 BEING THE MOST SEVERE PAIN POSSIBLE?: 1/10 PAIN

## 2025-06-16 ASSESSMENT — LOCATION ZONE DERM: LOCATION ZONE: ARM

## 2025-06-16 ASSESSMENT — LOCATION DETAILED DESCRIPTION DERM: LOCATION DETAILED: LEFT LATERAL PROXIMAL UPPER ARM

## 2025-06-16 ASSESSMENT — LOCATION SIMPLE DESCRIPTION DERM: LOCATION SIMPLE: LEFT UPPER ARM

## 2025-06-16 NOTE — PROCEDURE: LIQUID NITROGEN
Number Of Freeze-Thaw Cycles: 1 freeze-thaw cycle
Render Post-Care Instructions In Note?: yes
Medical Necessity Clause: This procedure was medically necessary because the lesions that were treated were:
Render Note In Bullet Format When Appropriate: No
Detail Level: Zone
Post-Care Instructions: I reviewed with the patient in detail post-care instructions. Patient is to wear sunprotection, and avoid picking at any of the treated lesions. Pt may apply Vaseline to crusted or scabbing areas.
Duration Of Freeze Thaw-Cycle (Seconds): 5-10
Spray Paint Text: The liquid nitrogen was applied to the skin utilizing a spray paint frosting technique.
Consent: The patient's consent was obtained including but not limited to risks of crusting, scabbing, blistering, scarring, darker or lighter pigmentary change, recurrence, incomplete removal and infection.
Medical Necessity Information: It is in your best interest to select a reason for this procedure from the list below. All of these items fulfill various CMS LCD requirements except the new and changing color options.